# Patient Record
Sex: MALE | Race: WHITE | NOT HISPANIC OR LATINO | Employment: FULL TIME | URBAN - METROPOLITAN AREA
[De-identification: names, ages, dates, MRNs, and addresses within clinical notes are randomized per-mention and may not be internally consistent; named-entity substitution may affect disease eponyms.]

---

## 2017-04-11 ENCOUNTER — OFFICE VISIT (OUTPATIENT)
Dept: FAMILY MEDICINE | Facility: CLINIC | Age: 57
End: 2017-04-11
Payer: COMMERCIAL

## 2017-04-11 VITALS
DIASTOLIC BLOOD PRESSURE: 76 MMHG | HEART RATE: 83 BPM | SYSTOLIC BLOOD PRESSURE: 126 MMHG | WEIGHT: 201 LBS | TEMPERATURE: 98.6 F | BODY MASS INDEX: 26.52 KG/M2

## 2017-04-11 DIAGNOSIS — R05.9 COUGH: Primary | ICD-10-CM

## 2017-04-11 DIAGNOSIS — G47.00 INSOMNIA, UNSPECIFIED TYPE: ICD-10-CM

## 2017-04-11 PROBLEM — Z71.89 ADVANCED DIRECTIVES, COUNSELING/DISCUSSION: Status: ACTIVE | Noted: 2017-04-11

## 2017-04-11 LAB
FEF 25/75: NORMAL
FEV-1: NORMAL
FEV1/FVC: NORMAL
FVC: NORMAL

## 2017-04-11 PROCEDURE — 99214 OFFICE O/P EST MOD 30 MIN: CPT | Mod: 25 | Performed by: FAMILY MEDICINE

## 2017-04-11 PROCEDURE — 94010 BREATHING CAPACITY TEST: CPT | Performed by: FAMILY MEDICINE

## 2017-04-11 RX ORDER — ALBUTEROL SULFATE 90 UG/1
2 AEROSOL, METERED RESPIRATORY (INHALATION) EVERY 6 HOURS PRN
Qty: 1 INHALER | Refills: 1 | Status: SHIPPED | OUTPATIENT
Start: 2017-04-11 | End: 2018-07-12

## 2017-04-11 RX ORDER — TRAZODONE HYDROCHLORIDE 50 MG/1
50 TABLET, FILM COATED ORAL AT BEDTIME
COMMUNITY
Start: 2017-03-21 | End: 2018-08-28

## 2017-04-11 NOTE — MR AVS SNAPSHOT
"              After Visit Summary   2017    Rajiv Finn    MRN: [id number]           Patient Information     Date Of Birth          1960        Visit Information        Provider Department      2017 2:30 PM Shiva Griffin MD [facility]        Today's Diagnoses     Cough    -  1    Insomnia, unspecified type           Follow-ups after your visit        Who to contact     If you have questions or need follow up information about today's clinic visit or your schedule please contact [facility] directly at [phone].  Normal or non-critical lab and imaging results will be communicated to you by [employer], letter or phone within 4 business days after the clinic has received the results. If you do not hear from us within 7 days, please contact the clinic through [employer] or phone. If you have a critical or abnormal lab result, we will notify you by phone as soon as possible.  Submit refill requests through [employer] or call your pharmacy and they will forward the refill request to us. Please allow 3 business days for your refill to be completed.          Additional Information About Your Visit        MyChart Information     [employer] lets you send messages to your doctor, view your test results, renew your prescriptions, schedule appointments and more. To sign up, go to [email or web address] . Click on \"Log in\" on the left side of the screen, which will take you to the Welcome page. Then click on \"Sign up Now\" on the right side of the page.     You will be asked to enter the access code listed below, as well as some personal information. Please follow the directions to create your username and password.     Your access code is: [email or web address]  Expires: 7/10/2017  3:21 PM     Your access code will  in 90 days. If you need help or a new code, please call your [facility] or [phone].        Care EveryWhere ID     This is your Care EveryWhere ID. This could be " used by other organizations to access your [place] medical records  [id number]        Your Vitals Were     Pulse Temperature BMI (Body Mass Index)             83 98.6  F (37  C) (Oral) 26.52 kg/m2          Blood Pressure from Last 3 Encounters:   04/11/17 126/76   05/06/16 136/85   01/27/16 131/78    Weight from Last 3 Encounters:   04/11/17 201 lb (91.2 kg)   05/06/16 193 lb 12.8 oz (87.9 kg)   01/27/16 190 lb 9.6 oz (86.5 kg)              We Performed the Following     Spirometry, Breathing Capacity          Today's Medication Changes          These changes are accurate as of: 4/11/17  3:21 PM.  If you have any questions, ask your nurse or doctor.               Start taking these medicines.        Dose/Directions    albuterol 108 (90 BASE) MCG/ACT Inhaler   Commonly known as:  PROAIR HFA/PROVENTIL HFA/VENTOLIN HFA   Used for:  Cough   Started by:  Shiva Griffin MD        Dose:  2 puff   Inhale 2 puffs into the lungs every 6 hours as needed for shortness of breath / dyspnea or wheezing   Quantity:  1 Inhaler   Refills:  1            Where to get your medicines      These medications were sent to [address]  [address] [place] [address]     Phone:  [phone]     albuterol 108 (90 BASE) MCG/ACT Inhaler                Primary Care Provider Office Phone # Fax #    Shiva Griffin [phone]       [address] [address]        Thank you!     Thank you for choosing [facility]  for your care. Our goal is always to provide you with excellent care. Hearing back from our patients is one way we can continue to improve our services. Please take a few minutes to complete the written survey that you may receive in the mail after your visit with us. Thank you!             Your Updated Medication List - Protect others around you: Learn how to safely use, store  and throw away your medicines at www.disposemymeds.org.          This list is accurate as of: 4/11/17  3:21 PM.  Always use your most recent med list.                   Brand Name Dispense Instructions for use    albuterol 108 (90 BASE) MCG/ACT Inhaler    PROAIR HFA/PROVENTIL HFA/VENTOLIN HFA    1 Inhaler    Inhale 2 puffs into the lungs every 6 hours as needed for shortness of breath / dyspnea or wheezing       tadalafil 20 MG tablet    CIALIS    90 tablet    Take 1 tablet (20 mg) by mouth daily Never use with nitroglycerin, terazosin or doxazosin.       traZODone 50 MG tablet    DESYREL     Take 50 mg by mouth At Bedtime 1-2 at bed time

## 2017-04-11 NOTE — NURSING NOTE
"Chief Complaint   Patient presents with     Sleep Problem     on going      Cough     on going x several years        Initial /76  Pulse 83  Temp 98.6  F (37  C) (Oral)  Wt 201 lb (91.2 kg)  BMI 26.52 kg/m2 Estimated body mass index is 26.52 kg/(m^2) as calculated from the following:    Height as of 6/8/15: 6' 1\" (1.854 m).    Weight as of this encounter: 201 lb (91.2 kg).  Medication Reconciliation: complete  David Lopez CMA    "

## 2017-04-11 NOTE — PROGRESS NOTES
.  SUBJECTIVE:  56 year old.The patient has a history of cough .  This started decades ago. quality occasionally productive/  He has seen ENT and tried nasocort but did not help . ROS no fever chills or asthma. No history of asthma in the family    Reviewed health maintenance  Patient Active Problem List   Diagnosis     CARDIOVASCULAR SCREENING; LDL GOAL LESS THAN 160     Health Care Home     Non-Hodgkin's lymphoma (H)     Cataract     ED (erectile dysfunction)     Advanced directives, counseling/discussion     Past Medical History:   Diagnosis Date     Malignant neoplasm (H)     non-Hodgkins Lymphoma       OBJECTIVE:  no apparent distress  /76  Pulse 83  Temp 98.6  F (37  C) (Oral)  Wt 201 lb (91.2 kg)  BMI 26.52 kg/m2  Nares clear  tms clear  Pharynx clear  LUNGS:  CTA B/L, no wheezing or crackles.   Cardiovascular: negative, PMI normal. No lifts, heaves, or thrills. RRR. No murmurs, clicks gallops or rub   Gastrointestinal: Abdomen soft, non-tender. BS normal. No masses, organomegaly   spirometry    ICD-10-CM    1. Cough R05     PLAN: albuterol inhaler          SUBJECTIVE:  56 year old.The patient has a history of insomnia.  He has seen sleep specialist and declined to do a sleep study.  This started years ago.he has lately been taking Trazodone Associated symptoms are does some snore .  Better with Over the counter antihistamine.  Reviewed health maintenance  Patient Active Problem List   Diagnosis     CARDIOVASCULAR SCREENING; LDL GOAL LESS THAN 160     Health Care Home     Non-Hodgkin's lymphoma (H)     Cataract     ED (erectile dysfunction)     Advanced directives, counseling/discussion     Past Medical History:   Diagnosis Date     Malignant neoplasm (H)     non-Hodgkins Lymphoma       OBJECTIVE:  no apparent distress  /76  Pulse 83  Temp 98.6  F (37  C) (Oral)  Wt 201 lb (91.2 kg)  BMI 26.52 kg/m2    LUNGS:  CTA B/L, no wheezing or crackles.   Cardiovascular: negative, PMI normal. No  lifts, heaves, or thrills. RRR. No murmurs, clicks gallops or rub   Gastrointestinal: Abdomen soft, non-tender. BS normal. No masses, organomegaly       ICD-10-CM    1. Cough R05     PLAN: Recheck 1 week

## 2017-06-23 ENCOUNTER — TELEPHONE (OUTPATIENT)
Dept: FAMILY MEDICINE | Facility: CLINIC | Age: 57
End: 2017-06-23

## 2017-06-23 NOTE — TELEPHONE ENCOUNTER
Per patient taking Trazodone for ~ 4 mos ago, will fall asleep easily, will wake up by 3 am, unable to go back to sleep.   Will lay there for 2-3 hours.  Does not want anything heavy duty, just wants to help sleep throughout the night.   Currently taking Trazodone 50 mg 2 tablets at bedtime.   Discussed there are no extended release dosing for Trazodone, however other medications to consider.   Discussed Hydroxyzine...     Not urgent.  Please advise  Vianca Howard RN

## 2017-06-23 NOTE — TELEPHONE ENCOUNTER
Patient calling states is currently on Trazadone 50 mg, states is wondering if he could try they extended release as starts to wear off. Please call to discuss.

## 2017-06-26 NOTE — TELEPHONE ENCOUNTER
Per verbal order read back Dr. Shiva Griffin :  Have him take trazodone 50mg at bedtime.  If wakes up take another 50mg tab.  See if this works better.   Lianne Avalos RN

## 2017-06-26 NOTE — TELEPHONE ENCOUNTER
Spoke with patient.  Aware of Dr. Shiva Griffin's instructions as per below pertaining to the trazodone.  States is willing to try splitting dose to see if works.  Lianne Avalos RN

## 2018-03-26 ENCOUNTER — DOCUMENTATION ONLY (OUTPATIENT)
Dept: LAB | Facility: CLINIC | Age: 58
End: 2018-03-26

## 2018-03-26 DIAGNOSIS — Z13.1 SCREENING FOR DIABETES MELLITUS: ICD-10-CM

## 2018-03-26 DIAGNOSIS — Z13.6 CARDIOVASCULAR SCREENING; LDL GOAL LESS THAN 160: Primary | ICD-10-CM

## 2018-03-26 DIAGNOSIS — Z11.59 NEED FOR HEPATITIS C SCREENING TEST: ICD-10-CM

## 2018-03-26 NOTE — PROGRESS NOTES
Need previsit labs-See pending orders and close encounter./Vickie Mckenna,         Physical 4/5/18

## 2018-03-26 NOTE — PROGRESS NOTES
Patient has an upcoming previsit appointment on 03/29/2018. Please review pended orders and add additional orders if needed.     Thank you,   Fallon Peterson

## 2018-03-26 NOTE — PROGRESS NOTES
"  SUBJECTIVE:   CC: Rajiv Finn is an 57 year old male who presents for preventative health visit.     Healthy Habits:    Do you get at least three servings of calcium containing foods daily (dairy, green leafy vegetables, etc.)? {YES/NO, DAIRY INTAKE:350563::\"yes\"}    Amount of exercise or daily activities, outside of work: {AMOUNT EXERCISE:546184}    Problems taking medications regularly {Yes /No default:799558::\"No\"}    Medication side effects: {Yes /No default.:967922::\"No\"}    Have you had an eye exam in the past two years? {YESNOBLANK:740631}    Do you see a dentist twice per year? {YESNOBLANK:837192}    Do you have sleep apnea, excessive snoring or daytime drowsiness?{YESNOBLANK:887454}  {Outside tests to abstract? :373803}     {additional problems to add (Optional):611602}    Today's PHQ-2 Score:   PHQ-2 ( 1999 Pfizer) 1/27/2016 9/17/2013   Q1: Little interest or pleasure in doing things 0 0   Q2: Feeling down, depressed or hopeless 0 0   PHQ-2 Score 0 0     {PHQ-2 LOOK IN ASSESSMENTS :004241}  Abuse: Current or Past(Physical, Sexual or Emotional)- {YES/NO/NA:962419}  Do you feel safe in your environment - {YES/NO/NA:706843}    Social History   Substance Use Topics     Smoking status: Never Smoker     Smokeless tobacco: Never Used     Alcohol use No      If you drink alcohol do you typically have >3 drinks per day or >7 drinks per week? {ETOH :511985}                      Last PSA: No results found for: PSA    Reviewed orders with patient. Reviewed health maintenance and updated orders accordingly - {Yes/No:566623::\"Yes\"}  {Chronicprobdata (Optional):301327}    Reviewed and updated as needed this visit by clinical staff         Reviewed and updated as needed this visit by Provider        {HISTORY OPTIONS (Optional):548675}    ROS:  {MALE ROS-adult preventive care package:778059::\"C: NEGATIVE for fever, chills, change in weight\",\"I: NEGATIVE for worrisome rashes, moles or lesions\",\"E: NEGATIVE for vision " "changes or irritation\",\"ENT: NEGATIVE for ear, mouth and throat problems\",\"R: NEGATIVE for significant cough or SOB\",\"CV: NEGATIVE for chest pain, palpitations or peripheral edema\",\"GI: NEGATIVE for nausea, abdominal pain, heartburn, or change in bowel habits\",\" male: negative for dysuria, hematuria, decreased urinary stream, erectile dysfunction, urethral discharge\",\"M: NEGATIVE for significant arthralgias or myalgia\",\"N: NEGATIVE for weakness, dizziness or paresthesias\",\"P: NEGATIVE for changes in mood or affect\"}    OBJECTIVE:   There were no vitals taken for this visit.  EXAM:  {Exam Choices:824567}    ASSESSMENT/PLAN:   {Diag Picklist:904963}    COUNSELING:  {MALE COUNSELING MESSAGES:372837::\"Reviewed preventive health counseling, as reflected in patient instructions\"}    {BP Counseling- Complete if BP >= 120/80  (Optional):507535}   reports that he has never smoked. He has never used smokeless tobacco.  {Tobacco Cessation -- Complete if patient is a smoker (Optional):017514}  Estimated body mass index is 26.52 kg/(m^2) as calculated from the following:    Height as of 6/8/15: 6' 1\" (1.854 m).    Weight as of 4/11/17: 201 lb (91.2 kg).   {Weight Management Plan (ACO) Complete if BMI is abnormal-  Ages 18-64  BMI >24.9.  Age 65+ with BMI <23 or >30 (Optional):293693}    Counseling Resources:  ATP IV Guidelines  Pooled Cohorts Equation Calculator  FRAX Risk Assessment  ICSI Preventive Guidelines  Dietary Guidelines for Americans, 2010  USDA's MyPlate  ASA Prophylaxis  Lung CA Screening    Shiva Griffin MD  [facility]  "

## 2018-03-29 DIAGNOSIS — Z13.6 CARDIOVASCULAR SCREENING; LDL GOAL LESS THAN 160: ICD-10-CM

## 2018-03-29 DIAGNOSIS — Z13.1 SCREENING FOR DIABETES MELLITUS: ICD-10-CM

## 2018-03-29 DIAGNOSIS — Z11.59 NEED FOR HEPATITIS C SCREENING TEST: ICD-10-CM

## 2018-03-29 LAB
ANION GAP SERPL CALCULATED.3IONS-SCNC: 7 MMOL/L (ref 3–14)
BUN SERPL-MCNC: 9 MG/DL (ref 7–30)
CALCIUM SERPL-MCNC: 9.4 MG/DL (ref 8.5–10.1)
CHLORIDE SERPL-SCNC: 104 MMOL/L (ref 94–109)
CHOLEST SERPL-MCNC: 211 MG/DL
CO2 SERPL-SCNC: 27 MMOL/L (ref 20–32)
CREAT SERPL-MCNC: 0.8 MG/DL (ref 0.66–1.25)
GFR SERPL CREATININE-BSD FRML MDRD: >90 ML/MIN/1.7M2
GLUCOSE SERPL-MCNC: 111 MG/DL (ref 70–99)
HCV AB SERPL QL IA: NONREACTIVE
HDLC SERPL-MCNC: 73 MG/DL
LDLC SERPL CALC-MCNC: 121 MG/DL
NONHDLC SERPL-MCNC: 138 MG/DL
POTASSIUM SERPL-SCNC: 4.6 MMOL/L (ref 3.4–5.3)
SODIUM SERPL-SCNC: 138 MMOL/L (ref 133–144)
TRIGL SERPL-MCNC: 87 MG/DL

## 2018-03-29 PROCEDURE — 80061 LIPID PANEL: CPT | Performed by: FAMILY MEDICINE

## 2018-03-29 PROCEDURE — 86803 HEPATITIS C AB TEST: CPT | Performed by: FAMILY MEDICINE

## 2018-03-29 PROCEDURE — 80048 BASIC METABOLIC PNL TOTAL CA: CPT | Performed by: FAMILY MEDICINE

## 2018-03-29 PROCEDURE — 36415 COLL VENOUS BLD VENIPUNCTURE: CPT | Performed by: FAMILY MEDICINE

## 2018-04-05 ENCOUNTER — OFFICE VISIT (OUTPATIENT)
Dept: FAMILY MEDICINE | Facility: CLINIC | Age: 58
End: 2018-04-05
Payer: COMMERCIAL

## 2018-04-05 VITALS
HEIGHT: 73 IN | TEMPERATURE: 99.3 F | RESPIRATION RATE: 16 BRPM | BODY MASS INDEX: 25.79 KG/M2 | DIASTOLIC BLOOD PRESSURE: 85 MMHG | WEIGHT: 194.6 LBS | SYSTOLIC BLOOD PRESSURE: 138 MMHG | HEART RATE: 86 BPM

## 2018-04-05 DIAGNOSIS — N52.9 ERECTILE DYSFUNCTION, UNSPECIFIED ERECTILE DYSFUNCTION TYPE: ICD-10-CM

## 2018-04-05 DIAGNOSIS — Z00.00 ROUTINE GENERAL MEDICAL EXAMINATION AT A HEALTH CARE FACILITY: Primary | ICD-10-CM

## 2018-04-05 DIAGNOSIS — R06.83 SNORING: ICD-10-CM

## 2018-04-05 PROCEDURE — 99396 PREV VISIT EST AGE 40-64: CPT | Performed by: FAMILY MEDICINE

## 2018-04-05 RX ORDER — TADALAFIL 20 MG/1
20 TABLET ORAL DAILY
Qty: 12 TABLET | Refills: 11 | Status: SHIPPED | OUTPATIENT
Start: 2018-04-05 | End: 2020-01-15 | Stop reason: ALTCHOICE

## 2018-04-05 NOTE — PROGRESS NOTES
SUBJECTIVE:   CC: Rajiv Finn is an 57 year old male who presents for preventative health visit.     Physical   Annual:     Getting at least 3 servings of Calcium per day::  Yes    Bi-annual eye exam::  Yes    Dental care twice a year::  Yes    Sleep apnea or symptoms of sleep apnea::  Daytime drowsiness    Diet::  Regular (no restrictions)    Frequency of exercise::  None    Taking medications regularly::  Yes    Medication side effects::  None    Additional concerns today::  No                Patient has insomnia and snoring.   He has seen a specialist and they claim he is not a candidate for a study.  He often uses alcohol to fall asleep. He has tried trazodone and it is not effective    Today's PHQ-2 Score:   PHQ-2 ( 1999 Pfizer) 4/5/2018   Q1: Little interest or pleasure in doing things 0   Q2: Feeling down, depressed or hopeless 0   PHQ-2 Score 0   Q1: Little interest or pleasure in doing things Not at all   Q2: Feeling down, depressed or hopeless Not at all   PHQ-2 Score 0       Abuse: Current or Past(Physical, Sexual or Emotional)- No  Do you feel safe in your environment - Yes    Social History   Substance Use Topics     Smoking status: Never Smoker     Smokeless tobacco: Never Used     Alcohol use No     Alcohol Use 4/5/2018   If you drink alcohol do you typically have greater than 3 drinks per day OR greater than 7 drinks per week? Yes   AUDIT SCORE  6     AUDIT - Alcohol Use Disorders Identification Test - Reproduced from the World Health Organization Audit 2001 (Second Edition) 4/5/2018   1.  How often do you have a drink containing alcohol? 4 or more times a week   2.  How many drinks containing alcohol do you have on a typical day when you are drinking? 1 or 2   3.  How often do you have five or more drinks on one occasion? Monthly   4.  How often during the last year have you found that you were not able to stop drinking once you had started? Never   5.  How often during the last year have you  "failed to do what was normally expected of you because of drinking? Never   6.  How often during the last year have you needed a first drink in the morning to get yourself going after a heavy drinking session? Never   7.  How often during the last year have you had a feeling of guilt or remorse after drinking? Never   8.  How often during the last year have you been unable to remember what happened the night before because of your drinking? Never   9.  Have you or someone else been injured because of your drinking? No   10. Has a relative, friend, doctor or other health care worker been concerned about your drinking or suggested you cut down? No   TOTAL SCORE 6       Last PSA: No results found for: PSA    Reviewed orders with patient. Reviewed health maintenance and updated orders accordingly - Yes       Reviewed and updated as needed this visit by clinical staff  Tobacco  Allergies  Meds  Med Hx  Surg Hx  Fam Hx  Soc Hx        Reviewed and updated as needed this visit by Provider            Review of Systems  C: NEGATIVE for fever, chills, change in weight  I: NEGATIVE for worrisome rashes, moles or lesions  E: NEGATIVE for vision changes or irritation  ENT: NEGATIVE for ear, mouth and throat problems  R: NEGATIVE for significant cough or SOB  CV: NEGATIVE for chest pain, palpitations or peripheral edema  GI: NEGATIVE for nausea, abdominal pain, heartburn, or change in bowel habits   male: negative for dysuria, hematuria, decreased urinary stream, erectile dysfunction, urethral discharge  M: NEGATIVE for significant arthralgias or myalgia  N: NEGATIVE for weakness, dizziness or paresthesias  P: NEGATIVE for changes in mood or affect    OBJECTIVE:   /85  Pulse 86  Temp 99.3  F (37.4  C) (Oral)  Resp 16  Ht 6' 1\" (1.854 m)  Wt 194 lb 9.6 oz (88.3 kg)  BMI 25.67 kg/m2    Physical Exam  GENERAL: healthy, alert and no distress  EYES: Eyes grossly normal to inspection, PERRL and conjunctivae and " "sclerae normal  HENT: ear canals and TM's normal, nose and mouth without ulcers or lesions  NECK: no adenopathy, no asymmetry, masses, or scars and thyroid normal to palpation  RESP: lungs clear to auscultation - no rales, rhonchi or wheezes  CV: regular rate and rhythm, normal S1 S2, no S3 or S4, no murmur, click or rub, no peripheral edema and peripheral pulses strong  ABDOMEN: soft, nontender, no hepatosplenomegaly, no masses and bowel sounds normal  MS: no gross musculoskeletal defects noted, no edema  SKIN: no suspicious lesions or rashes  NEURO: Normal strength and tone, mentation intact and speech normal  PSYCH: mentation appears normal, affect normal/bright    ASSESSMENT/PLAN:       ICD-10-CM    1. Routine general medical examination at a health care facility Z00.00    2. Erectile dysfunction, unspecified erectile dysfunction type N52.9 tadalafil (CIALIS) 20 MG tablet   3. Snoring R06.83 SLEEP EVALUATION & MANAGEMENT REFERRAL - ADULT -[facility] [place]  [phone] (Age 15 and up)       COUNSELING:   Reviewed preventive health counseling, as reflected in patient instructions       Regular exercise       Healthy diet/nutrition         reports that he has never smoked. He has never used smokeless tobacco.    Estimated body mass index is 25.67 kg/(m^2) as calculated from the following:    Height as of this encounter: 6' 1\" (1.854 m).    Weight as of this encounter: 194 lb 9.6 oz (88.3 kg).       Counseling Resources:  ATP IV Guidelines  Pooled Cohorts Equation Calculator  FRAX Risk Assessment  ICSI Preventive Guidelines  Dietary Guidelines for Americans, 2010  USDA's MyPlate  ASA Prophylaxis  Lung CA Screening    Shiva Griffin MD  [facility] [place]  Answers for HPI/ROS submitted by the patient on 4/5/2018   PHQ-2 Score: 0    "

## 2018-04-05 NOTE — MR AVS SNAPSHOT
After Visit Summary   4/5/2018    Rajiv Finn    MRN: [id number]           Patient Information     Date Of Birth          1960        Visit Information        Provider Department      4/5/2018 2:30 PM Shiva Griffin MD [facility] [place]        Today's Diagnoses     Routine general medical examination at a health care facility    -  1    Erectile dysfunction, unspecified erectile dysfunction type        Snoring          Care Instructions      Preventive Health Recommendations  Male Ages 50 - 64    Yearly exam:             See your health care provider every year in order to  o   Review health changes.   o   Discuss preventive care.    o   Review your medicines if your doctor has prescribed any.     Have a cholesterol test every 5 years, or more frequently if you are at risk for high cholesterol/heart disease.     Have a diabetes test (fasting glucose) every three years. If you are at risk for diabetes, you should have this test more often.     Have a colonoscopy at age 50, or have a yearly FIT test (stool test). These exams will check for colon cancer.      Talk with your health care provider about whether or not a prostate cancer screening test (PSA) is right for you.    You should be tested each year for STDs (sexually transmitted diseases), if you re at risk.     Shots: Get a flu shot each year. Get a tetanus shot every 10 years.     Nutrition:    Eat at least 5 servings of fruits and vegetables daily.     Eat whole-grain bread, whole-wheat pasta and brown rice instead of white grains and rice.     Talk to your provider about Calcium and Vitamin D.     Lifestyle    Exercise for at least 150 minutes a week (30 minutes a day, 5 days a week). This will help you control your weight and prevent disease.     Limit alcohol to one drink per day.     No smoking.     Wear sunscreen to prevent skin cancer.     See your dentist every six months for an exam and cleaning.     See your eye  "doctor every 1 to 2 years.            Follow-ups after your visit        Additional Services     SLEEP EVALUATION & MANAGEMENT REFERRAL - [facility] [place]  [phone] (Age 15 and up)       Please be aware that coverage of these services is subject to the terms and limitations of your health insurance plan.  Call member services at your health plan with any benefit or coverage questions.      Please bring the following to your appointment:    >>   List of current medications   >>   This referral request   >>   Any documents/labs given to you for this referral                      Future tests that were ordered for you today     Open Future Orders        Priority Expected Expires Ordered    SLEEP EVALUATION & MANAGEMENT REFERRAL - [facility]  [phone] (Age 15 and up) Routine  4/5/2019 4/5/2018            Who to contact     If you have questions or need follow up information about today's clinic visit or your schedule please contact [facility] directly at [phone].  Normal or non-critical lab and imaging results will be communicated to you by MyChart, letter or phone within 4 business days after the clinic has received the results. If you do not hear from us within 7 days, please contact the clinic through [employer] or phone. If you have a critical or abnormal lab result, we will notify you by phone as soon as possible.  Submit refill requests through [employer] or call your pharmacy and they will forward the refill request to us. Please allow 3 business days for your refill to be completed.          Additional Information About Your Visit        [employer] Information     [employer] lets you send messages to your doctor, view your test results, renew your prescriptions, schedule appointments and more. To sign up, go to [email or web address] . Click on \"Log in\" on the left side of the screen, which will take you to the Welcome page. Then " "click on \"Sign up Now\" on the right side of the page.     You will be asked to enter the access code listed below, as well as some personal information. Please follow the directions to create your username and password.     Your access code is: [email or web address]  Expires: 2018  3:05 PM     Your access code will  in 90 days. If you need help or a new code, please call your [facility] clinic or [phone].        Care EveryWhere ID     This is your Care EveryWhere ID. This could be used by other organizations to access your [facility] medical records  [id number]        Your Vitals Were     Pulse Temperature Respirations Height BMI (Body Mass Index)       86 99.3  F (37.4  C) (Oral) 16 6' 1\" (1.854 m) 25.67 kg/m2        Blood Pressure from Last 3 Encounters:   18 138/85   17 126/76   16 136/85    Weight from Last 3 Encounters:   18 194 lb 9.6 oz (88.3 kg)   17 201 lb (91.2 kg)   16 193 lb 12.8 oz (87.9 kg)                 Where to get your medicines      These medications were sent to [facility] [address] [place] [address], [address]  [address] [place], [place] [address]     Phone:  [phone]     tadalafil 20 MG tablet          Primary Care Provider Office Phone # Fax #    Shiva Griffin [phone] [address] [id number]        Equal Access to Services     [facility] Hospital[facility]I : Hadii mara Manley, waaxda luqadaha, qaybta kaalabhay santos. So [facility] [phone].    ATENCIÓN: Si habla español, tiene a szymanski disposición servicios gratuitos de asistencia lingüística. Llame al [phone].    We comply with applicable federal civil rights laws and [place] laws. We do not discriminate on the basis of race, color, national origin, age, disability, sex, sexual orientation, or gender identity.            Thank you!     Thank you for choosing [facility]  for your care. Our goal is always to provide you with excellent care. Hearing back from our patients is one way we can continue to improve our services. Please take a few minutes to complete the written survey that you may receive in the mail after your visit with us. Thank you!             Your Updated Medication List - Protect others around you: Learn how to safely use, store and throw away your medicines at www.disposemymeds.org.          This list is accurate as of 4/5/18  3:05 PM.  Always use your most recent med list.                   Brand Name Dispense Instructions for use Diagnosis    albuterol 108 (90 BASE) MCG/ACT Inhaler    PROAIR HFA/PROVENTIL HFA/VENTOLIN HFA    1 Inhaler    Inhale 2 puffs into the lungs every 6 hours as needed for shortness of breath / dyspnea or wheezing    Cough       tadalafil 20 MG tablet    CIALIS    12 tablet    Take 1 tablet (20 mg) by mouth daily Never use with nitroglycerin, terazosin or doxazosin.    Erectile dysfunction, unspecified erectile dysfunction type       traZODone 50 MG tablet    DESYREL     Take 50 mg by mouth At Bedtime 1-2 at bed time

## 2018-05-18 ENCOUNTER — OFFICE VISIT (OUTPATIENT)
Dept: SLEEP MEDICINE | Facility: CLINIC | Age: 58
End: 2018-05-18
Payer: COMMERCIAL

## 2018-05-18 VITALS
HEART RATE: 99 BPM | WEIGHT: 199 LBS | DIASTOLIC BLOOD PRESSURE: 83 MMHG | OXYGEN SATURATION: 96 % | BODY MASS INDEX: 26.37 KG/M2 | SYSTOLIC BLOOD PRESSURE: 136 MMHG | HEIGHT: 73 IN

## 2018-05-18 DIAGNOSIS — R06.89 DYSPNEA AND RESPIRATORY ABNORMALITY: Primary | ICD-10-CM

## 2018-05-18 DIAGNOSIS — R06.00 DYSPNEA AND RESPIRATORY ABNORMALITY: Primary | ICD-10-CM

## 2018-05-18 DIAGNOSIS — G47.00 INSOMNIA, UNSPECIFIED TYPE: ICD-10-CM

## 2018-05-18 DIAGNOSIS — R53.83 MALAISE AND FATIGUE: ICD-10-CM

## 2018-05-18 DIAGNOSIS — Z72.820 LACK OF ADEQUATE SLEEP: ICD-10-CM

## 2018-05-18 DIAGNOSIS — R06.83 SNORING: ICD-10-CM

## 2018-05-18 DIAGNOSIS — R53.81 MALAISE AND FATIGUE: ICD-10-CM

## 2018-05-18 PROCEDURE — 99203 OFFICE O/P NEW LOW 30 MIN: CPT | Performed by: PHYSICIAN ASSISTANT

## 2018-05-18 RX ORDER — ZOLPIDEM TARTRATE 5 MG/1
TABLET ORAL
Qty: 1 TABLET | Refills: 0 | Status: SHIPPED | OUTPATIENT
Start: 2018-05-18 | End: 2018-07-12

## 2018-05-18 NOTE — MR AVS SNAPSHOT
After Visit Summary   5/18/2018    Rajiv Finn    MRN: [id number]           Patient Information     Date Of Birth          1960        Visit Information        Provider Department      5/18/2018 3:00 PM Davis Wright PA [facility]        Today's Diagnoses     Dyspnea and respiratory abnormality    -  1    Snoring        Insomnia, unspecified type        Lack of adequate sleep        Malaise and fatigue          Care Instructions      Your BMI is Body mass index is 26.25 kg/(m^2).  Weight management is a personal decision.  If you are interested in exploring weight loss strategies, the following discussion covers the approaches that may be successful. Body mass index (BMI) is one way to tell whether you are at a healthy weight, overweight, or obese. It measures your weight in relation to your height.  A BMI of 18.5 to 24.9 is in the healthy range. A person with a BMI of 25 to 29.9 is considered overweight, and someone with a BMI of 30 or greater is considered obese. More than two-thirds of American adults are considered overweight or obese.  Being overweight or obese increases the risk for further weight gain. Excess weight may lead to heart disease and diabetes.  Creating and following plans for healthy eating and physical activity may help you improve your health.  Weight control is part of healthy lifestyle and includes exercise, emotional health, and healthy eating habits. Careful eating habits lifelong are the mainstay of weight control. Though there are significant health benefits from weight loss, long-term weight loss with diet alone may be very difficult to achieve- studies show long-term success with dietary management in less than 10% of people. Attaining a healthy weight may be especially difficult to achieve in those with severe obesity. In some cases, medications, devices and surgical management might be considered.  What can you do?  If you are overweight or  obese and are interested in methods for weight loss, you should discuss this with your provider.     Consider reducing daily calorie intake by 500 calories.     Keep a food journal.     Avoiding skipping meals, consider cutting portions instead.    Diet combined with exercise helps maintain muscle while optimizing fat loss. Strength training is particularly important for building and maintaining muscle mass. Exercise helps reduce stress, increase energy, and improves fitness. Increasing exercise without diet control, however, may not burn enough calories to loose weight.       Start walking three days a week 10-20 minutes at a time    Work towards walking thirty minutes five days a week     Eventually, increase the speed of your walking for 1-2 minutes at time    In addition, we recommend that you review healthy lifestyles and methods for weight loss available through the National Institutes of Health patient information sites:  http://win.niddk.nih.gov/publications/index.htm    And look into health and wellness programs that may be available through your health insurance provider, employer, local community center, or carmela club.    Weight management plan: Patient was referred to their PCP to discuss a diet and exercise plan.              Follow-ups after your visit        Your next 10 appointments already scheduled     Jun 29, 2018  8:00 PM CDT   PSG Split with BK BED 1   [facility] ([facility])    [address] [address]   [phone]            Jul 12, 2018  1:30 PM CDT   Return Sleep Patient with TAYLOR Staples   [facility] ([facility])    [address] [facility] [address]  [facility] [address]   [phone]              Future tests that were ordered for you today     Open Future Orders        Priority Expected Expires Ordered    Comprehensive Sleep Study Routine  11/14/2018  "2018            Who to contact     If you have questions or need follow up information about today's clinic visit or your schedule please contact [facility] directly at [phone].  Normal or non-critical lab and imaging results will be communicated to you by MyChart, letter or phone within 4 business days after the clinic has received the results. If you do not hear from us within 7 days, please contact the clinic through MyChart or phone. If you have a critical or abnormal lab result, we will notify you by phone as soon as possible.  Submit refill requests through [employer] or call your pharmacy and they will forward the refill request to us. Please allow 3 business days for your refill to be completed.          Additional Information About Your Visit        [facility] Information     [employer] lets you send messages to your doctor, view your test results, renew your prescriptions, schedule appointments and more. To sign up, go to [email or web address] . Click on \"Log in\" on the left side of the screen, which will take you to the Welcome page. Then click on \"Sign up Now\" on the right side of the page.     You will be asked to enter the access code listed below, as well as some personal information. Please follow the directions to create your username and password.     Your access code is: [email or web address]  Expires: 2018  3:05 PM     Your access code will  in 90 days. If you need help or a new code, please call your [facility] clinic or [phone].        Care EveryWhere ID     This is your Care EveryWhere ID. This could be used by other organizations to access your [facility] medical records  [id number]        Your Vitals Were     Pulse Height Pulse Oximetry BMI (Body Mass Index)          99 1.854 m (6' 1\") 96% 26.25 kg/m2         Blood Pressure from Last 3 Encounters:   18 143/83   18 138/85   17 126/76    Weight from Last 3 Encounters:   18 90.3 kg (199 lb) "   04/05/18 88.3 kg (194 lb 9.6 oz)   04/11/17 91.2 kg (201 lb)              We Performed the Following     SLEEP EVALUATION & MANAGEMENT REFERRAL - ADULT -[facility]  [phone] (Age 15 and up)          Today's Medication Changes          These changes are accurate as of 5/18/18  3:33 PM.  If you have any questions, ask your nurse or doctor.               Start taking these medicines.        Dose/Directions    zolpidem 5 MG tablet   Commonly known as:  AMBIEN   Started by:  Davsi Wright PA        Take tablet by mouth 15 minutes prior to sleep, for Sleep Study   Quantity:  1 tablet   Refills:  0            Where to get your medicines      Some of these will need a paper prescription and others can be bought over the counter.  Ask your nurse if you have questions.     Bring a paper prescription for each of these medications     zolpidem 5 MG tablet                Primary Care Provider Office Phone # Fax #    Shiva Sami Griffin [phone] [address] [facility] [address]        Equal Access to Services     CHALINO CASTREJON : Hadii mara ku hadasho Soomaali, waaxda luqadaha, qaybta kaalmada adeegyada, waxay ida haybehzad barroso . So [facility] [phone].    ATENCIÓN: Si habla español, tiene a szymanski disposición servicios gratuitos de asistencia lingüística. Llame al [phone].    We comply with applicable federal civil rights laws and [place] laws. We do not discriminate on the basis of race, color, national origin, age, disability, sex, sexual orientation, or gender identity.            Thank you!     Thank you for choosing [facility]  for your care. Our goal is always to provide you with excellent care. Hearing back from our patients is one way we can continue to improve our services. Please take a few minutes to complete the written survey that you may receive in the mail after your visit with us. Thank you!             Your  Updated Medication List - Protect others around you: Learn how to safely use, store and throw away your medicines at www.disposemymeds.org.          This list is accurate as of 5/18/18  3:33 PM.  Always use your most recent med list.                   Brand Name Dispense Instructions for use Diagnosis    albuterol 108 (90 Base) MCG/ACT Inhaler    PROAIR HFA/PROVENTIL HFA/VENTOLIN HFA    1 Inhaler    Inhale 2 puffs into the lungs every 6 hours as needed for shortness of breath / dyspnea or wheezing    Cough       tadalafil 20 MG tablet    CIALIS    12 tablet    Take 1 tablet (20 mg) by mouth daily Never use with nitroglycerin, terazosin or doxazosin.    Erectile dysfunction, unspecified erectile dysfunction type       traZODone 50 MG tablet    DESYREL     Take 50 mg by mouth At Bedtime 1-2 at bed time        zolpidem 5 MG tablet    AMBIEN    1 tablet    Take tablet by mouth 15 minutes prior to sleep, for Sleep Study

## 2018-05-18 NOTE — PATIENT INSTRUCTIONS

## 2018-05-18 NOTE — PROGRESS NOTES
Sleep Consultation:    Date on this visit: 5/18/2018    Rajiv Finn is sent by Shiva Griffin for a sleep consultation regarding possible sleep disordered breathing.    Primary Physician: Shiva Griffin     Chief Complaint   Patient presents with     Sleep Problem     consult- not sleeping well       Rajiv goes to sleep at 10:00 PM during the week. He wakes up at 6:00 AM with an alarm. He falls asleep in 15 minutes.  He takes Trazodone 50 mg nightly. Rajiv denies difficulty falling asleep.  He wakes up 1-2 times a night for 120 minutes before falling back to sleep. His mind is overactive.  Rajiv wakes up to go to the bathroom.  On weekends, Rajiv goes to sleep at 10:00 PM.  He wakes up at 7:00 AM without an alarm. He falls asleep in 15 minutes.  Patient gets an average of 5 hours of sleep per night. He does not feel refreshed.     Patient does watch TV in bed and does not use electronics in bed and read in bed.     Rajiv does not do shift work.     Rajiv does snore every night and snoring is very loud. Patient does have a regular bed partner. There is report of snoring.  He does not have witnessed apneas. They never sleep separately.  Patient sleeps on his back and side. He denies morning dry mouth, morning headaches, morning confusion and restless legs. Rajiv denies any bruxism, sleep walking, sleep talking, dream enactment, sleep paralysis, cataplexy and hypnogogic/hypnopompic hallucinations.    Rajiv denies difficulty breathing through his nose, claustrophobia and reflux at night.      Rajiv has gained 0-5 pounds in the last year.  Patient describes themself as a morning person.  He would prefer to go to sleep at 10:00 PM and wake up at 6:00 AM.  Patient's [place] Sleepiness score 1/24 inconsistent with excessive  daytime sleepiness.  He has fatigue.     Rajiv does not take nap. He takes some inadvertant naps.  He denies falling asleep while driving.  Patient was counseled on the  importance of driving while alert, to pull over if drowsy, or nap before getting into the vehicle if sleepy.  He uses no caffeine.     Allergies:    No Known Allergies    Medications:    Current Outpatient Prescriptions   Medication Sig Dispense Refill     traZODone (DESYREL) 50 MG tablet Take 50 mg by mouth At Bedtime 1-2 at bed time       zolpidem (AMBIEN) 5 MG tablet Take tablet by mouth 15 minutes prior to sleep, for Sleep Study 1 tablet 0     albuterol (PROAIR HFA/PROVENTIL HFA/VENTOLIN HFA) 108 (90 BASE) MCG/ACT Inhaler Inhale 2 puffs into the lungs every 6 hours as needed for shortness of breath / dyspnea or wheezing (Patient not taking: Reported on 4/5/2018) 1 Inhaler 1     tadalafil (CIALIS) 20 MG tablet Take 1 tablet (20 mg) by mouth daily Never use with nitroglycerin, terazosin or doxazosin. (Patient not taking: Reported on 5/18/2018) 12 tablet 11       Problem List:  Patient Active Problem List    Diagnosis Date Noted     Advanced directives, counseling/discussion 04/11/2017     Priority: Medium     Insomnia 01/19/2017     Priority: Medium     ED (erectile dysfunction) 09/17/2013     Priority: Medium     Non-Hodgkin's lymphoma (H) 10/19/2011     Priority: Medium     Health Care Home 07/13/2011     Priority: Medium     X  Unable to contact the patient at this time.Phone number is disconnecte, [facility] letter has been sent. 7/14/11. EG  DX V65.8 REPLACED WITH 89089 HEALTH CARE HOME (04/08/2013)       CARDIOVASCULAR SCREENING; LDL GOAL LESS THAN 160 10/31/2010     Priority: Medium        Past Medical/Surgical History:  Past Medical History:   Diagnosis Date     Cataract 8/6/2013     [place] Update utility     Malignant neoplasm (H)     non-Hodgkins Lymphoma     Past Surgical History:   Procedure Laterality Date     COLONOSCOPY WITH CO2 INSUFFLATION N/A 6/8/2015    Procedure: COLONOSCOPY WITH CO2 INSUFFLATION;  Surgeon: Ritesh Castle MD;  Location: [facility] OR     NO HISTORY OF SURGERY         Social  "History:  Social History     Social History     Marital status:      Spouse name: N/A     Number of children: N/A     Years of education: N/A     Occupational History     Not on file.     Social History Main Topics     Smoking status: Never Smoker     Smokeless tobacco: Never Used     Alcohol use No     Drug use: No     Sexual activity: Yes     Partners: Female     Other Topics Concern     Not on file     Social History Narrative       Family History:  No family history on file.    Review of Systems:  CONSTITUTIONAL: NEGATIVE for weight gain/loss, fever, chills, sweats or night sweats, drug allergies.  EYES: NEGATIVE for changes in vision, blind spots, double vision.  ENT: NEGATIVE for ear pain, sore throat, sinus pain, post-nasal drip, runny nose, bloody nose  CARDIAC: NEGATIVE for fast heartbeats or fluttering in chest, chest pain or pressure, breathlessness when lying flat, swollen legs or swollen feet.  NEUROLOGIC: NEGATIVE headaches, weakness or numbness in the arms or legs.  DERMATOLOGIC: NEGATIVE for rashes, new moles or change in mole(s)  PULMONARY: NEGATIVE SOB at rest, SOB with activity, dry cough, productive cough, coughing up blood, wheezing or whistling when breathing.    GASTROINTESTINAL: NEGATIVE for nausea or vomitting, loose or watery stools, fat or grease in stools, constipation, abdominal pain, bowel movements black in color or blood noted.  GENITOURINARY: NEGATIVE for pain during urination, blood in urine, urinating more frequently than usual.  MUSCULOSKELETAL: NEGATIVE for muscle pain, bone or joint pain, swollen joints.  ENDOCRINE: NEGATIVE for increased thirst or urination, diabetes.  LYMPHATIC: NEGATIVE for swollen lymph nodes, lumps or bumps in the breasts or nipple discharge.    Physical Examination:  Vitals: /83  Pulse 99  Ht 1.854 m (6' 1\")  Wt 90.3 kg (199 lb)  SpO2 96%  BMI 26.25 kg/m2  BMI= Body mass index is 26.25 kg/(m^2).         Brooks Total Score 5/18/2018 "   Total score - [place] 1       GENERAL APPEARANCE: alert and no distress  EYES: Eyes grossly normal to inspection, PERRL and conjunctivae and sclerae normal  HENT: ear canals and TM's normal, nose and mouth without ulcers or lesions, oropharynx crowded and uvula elongated  NECK: no asymmetry, masses, or scars  RESP: lungs clear to auscultation - no rales, rhonchi or wheezes  CV: regular rates and rhythm and normal S1 S2, no S3 or S4  MS: extremities normal- no gross deformities noted  NEURO: Normal strength and tone, mentation intact and speech normal  PSYCH: mentation appears normal and affect normal/bright  Mallampati Class: II.  Tonsillar Stage:     Last Basic Metabolic Panel:  Lab Results   Component Value Date     03/29/2018      Lab Results   Component Value Date    POTASSIUM 4.6 03/29/2018     Lab Results   Component Value Date    CHLORIDE 104 03/29/2018     Lab Results   Component Value Date    MINDY 9.4 03/29/2018     Lab Results   Component Value Date    CO2 27 03/29/2018     Lab Results   Component Value Date    BUN 9 03/29/2018     Lab Results   Component Value Date    CR 0.80 03/29/2018     Lab Results   Component Value Date     03/29/2018     No results found for: TSH]    Impression:  Patient has features and risk factors for possible obstructive sleep apnea including: loud snoring, non-refreshing sleep, daytime fatigue, sleep maintenance insomnia and crowded oropharynx. The STOP-BANG score is 4/8.     Plan:    1. Recommend Polysomnogram (using 4% desaturation/Medicare/2012 AASM 1B scoring rules) for intermediate risk obstructive sleep apnea. Ambien if needed. Patient is a poor candidate for Home Sleep Testing due to not high probability of MARGARET and insomnia.  2. Advised him against drowsy driving.    3. Recommend weight management.     Literature provided regarding sleep apnea.      He will follow up with me in approximately two weeks after his sleep study has been competed to review the  results and discuss plan of care.       Polysomnography reviewed.  Obstructive sleep apnea reviewed.  Complications of untreated sleep apnea were reviewed.    Davis Wright PA-C    CC: Shiva Griffin

## 2018-05-18 NOTE — NURSING NOTE
"Chief Complaint   Patient presents with     Sleep Problem     consult- not sleeping well       Initial /83  Pulse 99  Ht 1.854 m (6' 1\")  Wt 90.3 kg (199 lb)  SpO2 96%  BMI 26.25 kg/m2 Estimated body mass index is 26.25 kg/(m^2) as calculated from the following:    Height as of this encounter: 1.854 m (6' 1\").    Weight as of this encounter: 90.3 kg (199 lb).    Medication Reconciliation: complete    Neck circumference: 14.5 inches / 37 centimeters.      "

## 2018-06-29 ENCOUNTER — THERAPY VISIT (OUTPATIENT)
Dept: SLEEP MEDICINE | Facility: CLINIC | Age: 58
End: 2018-06-29
Payer: COMMERCIAL

## 2018-06-29 DIAGNOSIS — R53.81 MALAISE AND FATIGUE: ICD-10-CM

## 2018-06-29 DIAGNOSIS — R06.89 DYSPNEA AND RESPIRATORY ABNORMALITY: ICD-10-CM

## 2018-06-29 DIAGNOSIS — Z72.820 LACK OF ADEQUATE SLEEP: ICD-10-CM

## 2018-06-29 DIAGNOSIS — R06.00 DYSPNEA AND RESPIRATORY ABNORMALITY: ICD-10-CM

## 2018-06-29 DIAGNOSIS — R06.83 SNORING: ICD-10-CM

## 2018-06-29 DIAGNOSIS — G47.00 INSOMNIA, UNSPECIFIED TYPE: ICD-10-CM

## 2018-06-29 DIAGNOSIS — R53.83 MALAISE AND FATIGUE: ICD-10-CM

## 2018-06-29 PROCEDURE — 95810 POLYSOM 6/> YRS 4/> PARAM: CPT | Performed by: INTERNAL MEDICINE

## 2018-06-29 NOTE — MR AVS SNAPSHOT
"              After Visit Summary   6/29/2018    Rajiv Finn    MRN: [id number]           Patient Information     Date Of Birth          1960        Visit Information        Provider Department      6/29/2018 8:00 PM BK BED 1 [facility]        Today's Diagnoses     Snoring        Insomnia, unspecified type        Lack of adequate sleep        Dyspnea and respiratory abnormality        Malaise and fatigue           Follow-ups after your visit        Your next 10 appointments already scheduled     Jul 12, 2018  1:30 PM CDT   Return Sleep Patient with TAYLOR Staples   [facility] ([facility])    [address] [place] [id number] [phone]              Who to contact     If you have questions or need follow up information about today's clinic visit or your schedule please contact [facility]EP [facility] directly at [phone].  Normal or non-critical lab and imaging results will be communicated to you by MyChart, letter or phone within 4 business days after the clinic has received the results. If you do not hear from us within 7 days, please contact the clinic through SendtoNewshart or phone. If you have a critical or abnormal lab result, we will notify you by phone as soon as possible.  Submit refill requests through [employer] or call your pharmacy and they will forward the refill request to us. Please allow 3 business days for your refill to be completed.          Additional Information About Your Visit        MyChart Information     [employer] lets you send messages to your doctor, view your test results, renew your prescriptions, schedule appointments and more. To sign up, go to [email or web address] . Click on \"Log in\" on the left side of the screen, which will take you to the Welcome page. Then click on \"Sign up Now\" on the right side of the page.     You will be asked to enter the access code listed below, as well " as some personal information. Please follow the directions to create your username and password.     Your access code is: [email or web address]  Expires: 2018  3:05 PM     Your access code will  in 90 days. If you need help or a new code, please call your [place] clinic or [phone].        Care EveryWhere ID     This is your Care EveryWhere ID. This could be used by other organizations to access your [place] medical records  [id number]         Blood Pressure from Last 3 Encounters:   18 136/83   18 138/85   17 126/76    Weight from Last 3 Encounters:   18 90.3 kg (199 lb)   18 88.3 kg (194 lb 9.6 oz)   17 91.2 kg (201 lb)              We Performed the Following     Comprehensive Sleep Study        Primary Care Provider Office Phone # Fax #    Shiva Sami Griffin [phone] [address] [facility] [address]        Equal Access to Services     [facility]: Hadii aad ku hadasho Soomaali, waaxda luqadaha, qaybta kaalmada adeegyada, abhay barroso . So [facility] [phone].    ATENCIÓN: Si habla español, tiene a szymanski disposición servicios gratuitos de asistencia lingüística. Llame al [phone].    We comply with applicable federal civil rights laws and [place] laws. We do not discriminate on the basis of race, color, national origin, age, disability, sex, sexual orientation, or gender identity.            Thank you!     Thank you for choosing [facility]  for your care. Our goal is always to provide you with excellent care. Hearing back from our patients is one way we can continue to improve our services. Please take a few minutes to complete the written survey that you may receive in the mail after your visit with us. Thank you!             Your Updated Medication List - Protect others around you: Learn how to safely use, store and throw away your medicines at www.disposemymeds.org.          This list is  accurate as of 6/29/18 11:59 PM.  Always use your most recent med list.                   Brand Name Dispense Instructions for use Diagnosis    albuterol 108 (90 Base) MCG/ACT Inhaler    PROAIR HFA/PROVENTIL HFA/VENTOLIN HFA    1 Inhaler    Inhale 2 puffs into the lungs every 6 hours as needed for shortness of breath / dyspnea or wheezing    Cough       tadalafil 20 MG tablet    CIALIS    12 tablet    Take 1 tablet (20 mg) by mouth daily Never use with nitroglycerin, terazosin or doxazosin.    Erectile dysfunction, unspecified erectile dysfunction type       traZODone 50 MG tablet    DESYREL     Take 50 mg by mouth At Bedtime 1-2 at bed time        zolpidem 5 MG tablet    AMBIEN    1 tablet    Take tablet by mouth 15 minutes prior to sleep, for Sleep Study

## 2018-07-11 LAB — SLPCOMP: NORMAL

## 2018-07-11 NOTE — PROCEDURES
" SLEEP STUDY INTERPRETATION  DIAGNOSTIC POLYSOMNOGRAPHY REPORT      Patient: RYAN MELTON  YOB: 1960  Study Date: 6/29/2018  MRN: [id number]  Referring Provider: Dr. Griffin  Ordering Provider: Dr. Landin    Indications for Polysomnography: The patient is a 57 y old Male who is 6' 1\" and weighs 199.0 lbs. His BMI is 26.4, [place] sleepiness scale 1.0 and neck circumference is 37.0 cm. Relevant medical history includes Insomnia, ED, and non-Hodgkin's lymphoma. A diagnostic polysomnogram was performed to evaluate for MARGARET.    Polysomnogram Data: A full night polysomnogram recorded the standard physiologic parameters including EEG, EOG, EMG, ECG, nasal and oral airflow. Respiratory parameters of chest and abdominal movements were recorded with respiratory inductance plethysmography. Oxygen saturation was recorded by pulse oximetry. Hypopnea scoring rule used: 1B 4%.    Sleep Architecture: Increased sleep latency despite zolpidem 5 mg, increased arousal index, and decreased sleep efficiency.  The total recording time of the polysomnogram was 464.3 minutes. The total sleep time was 360.0 minutes. Sleep latency was increased at 38.4 minutes with the use of a sleep aid. REM latency was 30.0 minutes. Arousal index was increased at 25.7 arousals per hour. Sleep efficiency was decreased at 77.5%. Wake after sleep onset was 64.5 minutes. The patient spent 16.3% of total sleep time in Stage N1, 43.2% in Stage N2, 14.2% in Stage N3, and 26.4% in REM. Time in REM supine was 5.5 minutes.    Respiration: Tachypnea noted during wake with borderline tachypnea during sleep.  Snoring without significant sleep-disordered breathing by AHI (consider 3% scoring if symptomatic).    Events ? The polysomnogram revealed a presence of 17 obstructive, - central, and - mixed apneas resulting in an apnea index of 2.8 events per hour. There were 8 obstructive hypopneas and - central hypopneas resulting in an obstructive hypopnea " index of 1.3 and central hypopnea index of - events per hour. The combined apnea/hypopnea index was 4.2 events per hour (central apnea/hypopnea index was - events per hour). The REM AHI was 3.2 events per hour. The supine AHI was 11.9 events per hour. The RERA index was 3.0 events per hour.  The RDI was 7.2 events per hour.    Snoring - was reported as mild to moderate (difficult to hear over fan).    Respiratory rate and pattern - was notable for tachypnea.    Sustained Sleep Associated Hypoventilation - Transcutaneous carbon dioxide monitoring was not used; however significant hypoventilation was not suggested by oximetry.    Sleep Associated Hypoxemia - (Greater than 5 minutes O2 sat at or below 88%) was not present. Baseline oxygen saturation was 95.8%. Lowest oxygen saturation was 86.5%. Time spent less than or equal to 88% was 0.2 minutes. Time spent less than or equal to 89% was 0.5 minutes.    Movement Activity: Marked increase in PLM activity.  Abnormal REM EMG activity.    Periodic Limb Activity - There were 267 PLMs during the entire study. The PLM index was 44.5 movements per hour. The PLM Arousal Index was 11.0 per hour.    REM EMG Activity - Excessive transient muscle activity was present.    Nocturnal Behavior - Abnormal sleep related behaviors were not noted.    Bruxism - None apparent.    Cardiac Summary: No significant arrhythmias noted.  The average pulse rate was 67.8 bpm. The minimum pulse rate was 51.9 bpm while the maximum pulse rate was 96.9 bpm.  Arrhythmias were not noted.    Assessment:     Increased sleep latency despite zolpidem 5 mg, increased arousal index, and decreased sleep efficiency.    Tachypnea noted during wake with borderline tachypnea during sleep.      Snoring without significant sleep-disordered breathing by AHI (consider 3% scoring if symptomatic).    Marked increase in PLM activity.      Abnormal REM EMG activity.    No significant arrhythmias  noted.    Recommendations:    Clinical correlation for abnormal REM EMG activity.    Clinical correlation for tachypnea.    Advice regarding the risks of drowsy driving.    Suggest optimizing sleep schedule and avoiding sleep deprivation.    Pharmacologic therapy should be used for management of restless legs syndrome only if present and clinically indicated and not based on the presence of periodic limb movements alone.    Diagnostic Codes:   Unspecified Sleep Disturbance G47.9  Parasomnia, Unspecified G47.50   _____________________________________   Electronically Signed By: Jhon Mckay MD 7/10/2018

## 2018-07-12 ENCOUNTER — OFFICE VISIT (OUTPATIENT)
Dept: SLEEP MEDICINE | Facility: CLINIC | Age: 58
End: 2018-07-12
Payer: COMMERCIAL

## 2018-07-12 VITALS
SYSTOLIC BLOOD PRESSURE: 136 MMHG | BODY MASS INDEX: 26.37 KG/M2 | DIASTOLIC BLOOD PRESSURE: 76 MMHG | HEIGHT: 73 IN | OXYGEN SATURATION: 98 % | HEART RATE: 76 BPM | WEIGHT: 199 LBS

## 2018-07-12 DIAGNOSIS — R06.83 SNORING: ICD-10-CM

## 2018-07-12 DIAGNOSIS — G47.61 PLMD (PERIODIC LIMB MOVEMENT DISORDER): ICD-10-CM

## 2018-07-12 DIAGNOSIS — G47.61 PLMD (PERIODIC LIMB MOVEMENT DISORDER): Primary | ICD-10-CM

## 2018-07-12 LAB — FERRITIN SERPL-MCNC: 226 NG/ML (ref 26–388)

## 2018-07-12 PROCEDURE — 36415 COLL VENOUS BLD VENIPUNCTURE: CPT | Performed by: PHYSICIAN ASSISTANT

## 2018-07-12 PROCEDURE — 82728 ASSAY OF FERRITIN: CPT | Performed by: PHYSICIAN ASSISTANT

## 2018-07-12 PROCEDURE — 99214 OFFICE O/P EST MOD 30 MIN: CPT | Performed by: PHYSICIAN ASSISTANT

## 2018-07-12 RX ORDER — GABAPENTIN 100 MG/1
CAPSULE ORAL
Qty: 90 CAPSULE | Refills: 1 | Status: SHIPPED | OUTPATIENT
Start: 2018-07-12 | End: 2018-10-02

## 2018-07-12 NOTE — PROGRESS NOTES
Sleep Study Follow-Up Visit:    Date on this visit: 7/12/2018    Rajiv Finn comes in today for follow-up of his sleep study done on 6/29/2018 at the [facility] for loud snoring, non-refreshing sleep, daytime fatigue, sleep maintenance insomnia and crowded oropharynx.    Diagnostic PSG as interpreted by Dr. Mckay-  Polysomnogram Data: A full night polysomnogram recorded the standard physiologic parameters including EEG, EOG, EMG, ECG, nasal and oral airflow. Respiratory parameters of chest and abdominal movements were recorded with respiratory inductance plethysmography. Oxygen saturation was recorded by pulse oximetry. Hypopnea scoring rule used: 1B 4%.     Sleep Architecture: Increased sleep latency despite zolpidem 5 mg, increased arousal index, and decreased sleep efficiency.  The total recording time of the polysomnogram was 464.3 minutes. The total sleep time was 360.0 minutes. Sleep latency was increased at 38.4 minutes with the use of a sleep aid. REM latency was 30.0 minutes. Arousal index was increased at 25.7 arousals per hour. Sleep efficiency was decreased at 77.5%. Wake after sleep onset was 64.5 minutes. The patient spent 16.3% of total sleep time in Stage N1, 43.2% in Stage N2, 14.2% in Stage N3, and 26.4% in REM. Time in REM supine was 5.5 minutes.     Respiration: Tachypnea noted during wake with borderline tachypnea during sleep.  Snoring without significant sleep-disordered breathing by AHI (consider 3% scoring if symptomatic).    Events ? The polysomnogram revealed a presence of 17 obstructive, - central, and - mixed apneas resulting in an apnea index of 2.8 events per hour. There were 8 obstructive hypopneas and - central hypopneas resulting in an obstructive hypopnea index of 1.3 and central hypopnea index of - events per hour. The combined apnea/hypopnea index was 4.2 events per hour (central apnea/hypopnea index was - events per hour). The REM AHI was 3.2  events per hour. The supine AHI was 11.9 events per hour. The RERA index was 3.0 events per hour.  The RDI was 7.2 events per hour.    Snoring - was reported as mild to moderate (difficult to hear over fan).    Respiratory rate and pattern - was notable for tachypnea.    Sustained Sleep Associated Hypoventilation - Transcutaneous carbon dioxide monitoring was not used; however significant hypoventilation was not suggested by oximetry.    Sleep Associated Hypoxemia - (Greater than 5 minutes O2 sat at or below 88%) was not present. Baseline oxygen saturation was 95.8%. Lowest oxygen saturation was 86.5%. Time spent less than or equal to 88% was 0.2 minutes. Time spent less than or equal to 89% was 0.5 minutes.     Movement Activity: Marked increase in PLM activity.  Abnormal REM EMG activity.    Periodic Limb Activity - There were 267 PLMs during the entire study. The PLM index was 44.5 movements per hour. The PLM Arousal Index was 11.0 per hour.    REM EMG Activity - Excessive transient muscle activity was present.    Nocturnal Behavior - Abnormal sleep related behaviors were not noted.    Bruxism - None apparent.     Cardiac Summary: No significant arrhythmias noted.  The average pulse rate was 67.8 bpm. The minimum pulse rate was 51.9 bpm while the maximum pulse rate was 96.9 bpm.  Arrhythmias were not noted.     These findings were reviewed with patient. A copy of the sleep study was given to the patient for his records.     Past medical/surgical history, family history, social history, medications and allergies were reviewed.      Problem List:  Patient Active Problem List    Diagnosis Date Noted     Advanced directives, counseling/discussion 04/11/2017     Priority: Medium     Insomnia 01/19/2017     Priority: Medium     ED (erectile dysfunction) 09/17/2013     Priority: Medium     Non-Hodgkin's lymphoma (H) 10/19/2011     Priority: Medium     Health Care Home 07/13/2011     Priority: Medium     X  Unable to  "contact the patient at this time.Phone number is joo, [facility] letter has been sent. 7/14/11. EG  DX V65.8 REPLACED WITH [id number] HEALTH CARE HOME (04/08/2013)       CARDIOVASCULAR SCREENING; LDL GOAL LESS THAN 160 10/31/2010     Priority: Medium      /76  Pulse 76  Ht 1.854 m (6' 1\")  Wt 90.3 kg (199 lb)  SpO2 98%  BMI 26.25 kg/m2  Impression/Plan:  1. The patient's sleep disordered breathing did not reach a level of clinical significance with AHI of <5. This was a good study that included time spent in REM sleep.     2. Patient had elevated periodic limp movements during the study. Patient denies wakeful motor restlessness.   - Discontinue Trazodone and start gabapentin 100-300 mg an hour before bedtime.  - Check Ferritin.    3. Abnormal EMG activity.  -  Patient is here with wife and she reports active dreams and movement of legs at night.   -  He denies problems with sense of smell, constipation and treomor.  -  Discussed etiology of NREM/REM parasomnias, reduction of triggers (novel environments, caffeine, alcohol, sleep deprivation), safety (consideration of door, matress rather than bed, securing firearms) and consideration for pharmacotherapy(Melatonin or Clonazepam). He wants to wait on pharmacotherapy given not getting out of bed.    4. Tachypnea noted during wake with borderline tachypnea during sleep(18-22)  - He denies dyspnea.  - I advised that he f/u with his PCP for further evaluation and management.       He will follow up with me in about 2 month(s).     Twenty-five minutes spent with patient, all of which were spent face-to-face counseling, consulting, coordinating plan of care regarding MARGARET, PLMD, RBD and tachypnea.      Davis Wright PA-C    CC: Shiva Griffin     "

## 2018-07-12 NOTE — PATIENT INSTRUCTIONS

## 2018-07-12 NOTE — MR AVS SNAPSHOT
After Visit Summary   7/12/2018    Rajiv Finn    MRN: [id number]           Patient Information     Date Of Birth          1960        Visit Information        Provider Department      7/12/2018 1:30 PM Davis Wright PA [facility]        Today's Diagnoses     PLMD (periodic limb movement disorder)    -  1    Snoring          Care Instructions      Your BMI is Body mass index is 26.25 kg/(m^2).  Weight management is a personal decision.  If you are interested in exploring weight loss strategies, the following discussion covers the approaches that may be successful. Body mass index (BMI) is one way to tell whether you are at a healthy weight, overweight, or obese. It measures your weight in relation to your height.  A BMI of 18.5 to 24.9 is in the healthy range. A person with a BMI of 25 to 29.9 is considered overweight, and someone with a BMI of 30 or greater is considered obese. More than two-thirds of American adults are considered overweight or obese.  Being overweight or obese increases the risk for further weight gain. Excess weight may lead to heart disease and diabetes.  Creating and following plans for healthy eating and physical activity may help you improve your health.  Weight control is part of healthy lifestyle and includes exercise, emotional health, and healthy eating habits. Careful eating habits lifelong are the mainstay of weight control. Though there are significant health benefits from weight loss, long-term weight loss with diet alone may be very difficult to achieve- studies show long-term success with dietary management in less than 10% of people. Attaining a healthy weight may be especially difficult to achieve in those with severe obesity. In some cases, medications, devices and surgical management might be considered.  What can you do?  If you are overweight or obese and are interested in methods for weight loss, you should discuss this with your  provider.     Consider reducing daily calorie intake by 500 calories.     Keep a food journal.     Avoiding skipping meals, consider cutting portions instead.    Diet combined with exercise helps maintain muscle while optimizing fat loss. Strength training is particularly important for building and maintaining muscle mass. Exercise helps reduce stress, increase energy, and improves fitness. Increasing exercise without diet control, however, may not burn enough calories to loose weight.       Start walking three days a week 10-20 minutes at a time    Work towards walking thirty minutes five days a week     Eventually, increase the speed of your walking for 1-2 minutes at time    In addition, we recommend that you review healthy lifestyles and methods for weight loss available through the National Institutes of Health patient information sites:  http://win.niddk.nih.gov/publications/index.htm    And look into health and wellness programs that may be available through your health insurance provider, employer, local community center, or [employer]mela club.    Weight management plan: Patient was referred to their PCP to discuss a diet and exercise plan.              Follow-ups after your visit        Follow-up notes from your care team     Return in about 2 months (around 9/12/2018).      Your next 10 appointments already scheduled     Jul 12, 2018  2:30 PM CDT   LAB with BK LAB   [facility] ([facility]    [address] [place] [id number] [phone]           Please do not eat 10-12 hours before your appointment if you are coming in fasting for labs on lipids, cholesterol, or glucose (sugar). This does not apply to pregnant women. Water, hot tea and black coffee (with nothing added) are okay. Do not drink other fluids, diet soda or chew gum.              Future tests that were ordered for you today     Open Future Orders        Priority Expected Expires Ordered     "Ferritin Routine  9/10/2018 7/12/2018            Who to contact     If you have questions or need follow up information about today's clinic visit or your schedule please contact [facility] SLEEP CLINIC directly at [phone].  Normal or non-critical lab and imaging results will be communicated to you by MyChart, letter or phone within 4 business days after the clinic has received the results. If you do not hear from us within 7 days, please contact the clinic through MyChart or phone. If you have a critical or abnormal lab result, we will notify you by phone as soon as possible.  Submit refill requests through [employer] or call your pharmacy and they will forward the refill request to us. Please allow 3 business days for your refill to be completed.          Additional Information About Your Visit        Care EveryWhere ID     This is your Care EveryWhere ID. This could be used by other organizations to access your [place] medical records  [id number]        Your Vitals Were     Pulse Height Pulse Oximetry BMI (Body Mass Index)          76 1.854 m (6' 1\") 98% 26.25 kg/m2         Blood Pressure from Last 3 Encounters:   07/12/18 136/76   05/18/18 136/83   04/05/18 138/85    Weight from Last 3 Encounters:   07/12/18 90.3 kg (199 lb)   05/18/18 90.3 kg (199 lb)   04/05/18 88.3 kg (194 lb 9.6 oz)                 Today's Medication Changes          These changes are accurate as of 7/12/18  2:18 PM.  If you have any questions, ask your nurse or doctor.               Start taking these medicines.        Dose/Directions    gabapentin 100 MG capsule   Commonly known as:  NEURONTIN   Started by:  Davis Wright PA        Take 1-3 tablets an hour before bedtime.   Quantity:  90 capsule   Refills:  1         Stop taking these medicines if you haven't already. Please contact your care team if you have questions.     albuterol 108 (90 Base) MCG/ACT Inhaler   Commonly known as:  PROAIR HFA/PROVENTIL HFA/VENTOLIN HFA "   Stopped by:  Davis Wright PA           zolpidem 5 MG tablet   Commonly known as:  AMBIEN   Stopped by:  Davis Wright PA                Where to get your medicines      These medications were sent to [facility]/pharmacy #[id number] - [place], [place] - [address]  AT CORNER OF [facility]  [address] [facility] , [facility] [address]     Phone:  [phone]     gabapentin 100 MG capsule                Primary Care Provider Office Phone # Fax #    Shiva Griffin [phone] [address] [facility] [address]        Equal Access to Services     [facility]: Hadii mara mcadams hadasho Soomaali, waaxda luqadaha, qaybta kaalmada adeegyada, abhay barroso . So [facility] [phone].    ATENCIÓN: Si habla español, tiene a szymanski disposición servicios gratuitos de asistencia lingüística. [facility] [phone].    We comply with applicable federal civil rights laws and [place] laws. We do not discriminate on the basis of race, color, national origin, age, disability, sex, sexual orientation, or gender identity.            Thank you!     Thank you for choosing [facility] SLEEP CLINIC  for your care. Our goal is always to provide you with excellent care. Hearing back from our patients is one way we can continue to improve our services. Please take a few minutes to complete the written survey that you may receive in the mail after your visit with us. Thank you!             Your Updated Medication List - Protect others around you: Learn how to safely use, store and throw away your medicines at www.disposemymeds.org.          This list is accurate as of 7/12/18  2:18 PM.  Always use your most recent med list.                   Brand Name Dispense Instructions for use Diagnosis    gabapentin 100 MG capsule    NEURONTIN    90 capsule    Take 1-3 tablets an hour before bedtime.        tadalafil 20 MG tablet    CIALIS    12 tablet    Take 1 tablet (20 mg) by mouth  daily Never use with nitroglycerin, terazosin or doxazosin.    Erectile dysfunction, unspecified erectile dysfunction type       traZODone 50 MG tablet    DESYREL     Take 50 mg by mouth At Bedtime 1-2 at bed time

## 2018-07-12 NOTE — NURSING NOTE
"Chief Complaint   Patient presents with     Study Results     psg results       Initial /76  Pulse 76  Ht 1.854 m (6' 1\")  Wt 90.3 kg (199 lb)  SpO2 98%  BMI 26.25 kg/m2 Estimated body mass index is 26.25 kg/(m^2) as calculated from the following:    Height as of this encounter: 1.854 m (6' 1\").    Weight as of this encounter: 90.3 kg (199 lb).    Medication Reconciliation: complete      "

## 2018-07-19 ENCOUNTER — TELEPHONE (OUTPATIENT)
Dept: SLEEP MEDICINE | Facility: CLINIC | Age: 58
End: 2018-07-19

## 2018-07-19 NOTE — TELEPHONE ENCOUNTER
Start taking Trazodone again. Will try to get off as we go up on Gabapentin. Reviewed side effects including sedation, advised not to mix with alcohol and other sedating medications and call with any concerns or questions.    Davis Wright PA-C

## 2018-07-19 NOTE — TELEPHONE ENCOUNTER
Reason for Call:  Other prescription    Detailed comments: was taken off trazadone and was put on gabapentin. He has trouble falling asleep now please advise    Phone Number Patient can be reached at: Cell number on file:    Telephone Information:   Mobile [phone]       Best Time: anytime     Can we leave a detailed message on this number? YES    Call taken on 7/19/2018 at 9:18 AM by Ursula Bass

## 2018-08-02 ENCOUNTER — TRANSFERRED RECORDS (OUTPATIENT)
Dept: HEALTH INFORMATION MANAGEMENT | Facility: CLINIC | Age: 58
End: 2018-08-02

## 2018-08-27 ENCOUNTER — TELEPHONE (OUTPATIENT)
Dept: SLEEP MEDICINE | Facility: CLINIC | Age: 58
End: 2018-08-27

## 2018-08-27 NOTE — TELEPHONE ENCOUNTER
Reason for Call:  Medication or medication refill:    Do you use a [facility]?  Name of the pharmacy and phone number for the current request:  [facility] [phone]    Name of the medication requested: traZODone    Other request: refill for traZODone    Can we leave a detailed message on this number? YES    Phone number patient can be reached at: Home number on file [phone] (home)    Best Time: anytime     Call taken on 8/27/2018 at 10:48 AM by Codi Austin

## 2018-08-28 RX ORDER — TRAZODONE HYDROCHLORIDE 50 MG/1
50 TABLET, FILM COATED ORAL AT BEDTIME
Qty: 90 TABLET | Refills: 1 | Status: SHIPPED | OUTPATIENT
Start: 2018-08-28 | End: 2018-11-19

## 2018-10-02 RX ORDER — GABAPENTIN 100 MG/1
CAPSULE ORAL
Qty: 90 CAPSULE | Refills: 1 | Status: SHIPPED | OUTPATIENT
Start: 2018-10-02 | End: 2018-11-05

## 2018-10-02 NOTE — TELEPHONE ENCOUNTER
Gabapentin 100 mg capsules      Last Written Prescription Date:  7-12-18  Last Fill Quantity: 90,   # refills: 1  Last Office Visit: 7-12-18  Future Office visit:     10- TAYLOR Garcia  Routing refill request to provider for review/approval because:  Drug not on the [facility], [facility] or [facility] refill protocol

## 2018-10-25 ENCOUNTER — OFFICE VISIT (OUTPATIENT)
Dept: SLEEP MEDICINE | Facility: CLINIC | Age: 58
End: 2018-10-25
Payer: COMMERCIAL

## 2018-10-25 VITALS
BODY MASS INDEX: 26.37 KG/M2 | OXYGEN SATURATION: 97 % | WEIGHT: 199 LBS | HEART RATE: 69 BPM | DIASTOLIC BLOOD PRESSURE: 80 MMHG | SYSTOLIC BLOOD PRESSURE: 138 MMHG | HEIGHT: 73 IN

## 2018-10-25 DIAGNOSIS — G47.61 PLMD (PERIODIC LIMB MOVEMENT DISORDER): ICD-10-CM

## 2018-10-25 PROCEDURE — 99214 OFFICE O/P EST MOD 30 MIN: CPT | Performed by: PHYSICIAN ASSISTANT

## 2018-10-25 NOTE — PROGRESS NOTES
"Medication Follow-Up Visit:    Chief Complaint   Patient presents with     RECHECK       Rajiv Finn comes in today for follow-up of PLMD and insomnia treated Gabapentin 200-300 mg and Trazodone 50 mg nightly. 300 mg of Gabapentin did not make a difference.     Overall, the patient reports they are doing well, \"not perfect\"  Patient is not having medication side effects.     During work days bedtime is typically 10 PM. Usually it takes about 20 minutes to fall asleep. They are typically getting out of bed at 6 AM.    On non-work days bedtime is typically 10 PM. Usually it takes about 20 minutes to fall asleep. They are typically getting out of bed at 6:30 AM.      The patient is usually getting 6-7 hours of sleep per night.  Patient is not napping.   Patient is not using caffeine.    Total score - [place]: 1 (10/25/2018  1:00 PM)    DAVID Total Score: 13    Past medical/surgical history, family history, social history, medications and allergies were reviewed.      Problem List:  Patient Active Problem List    Diagnosis Date Noted     Advanced directives, counseling/discussion 04/11/2017     Priority: Medium     Insomnia 01/19/2017     Priority: Medium     ED (erectile dysfunction) 09/17/2013     Priority: Medium     Non-Hodgkin's lymphoma (H) 10/19/2011     Priority: Medium     Health Care Home 07/13/2011     Priority: Medium     X  Unable to contact the patient at this time.Phone number is disconnecte, [facility] letter has been sent. 7/14/11. EG  DX V65.8 REPLACED WITH 69238 HEALTH CARE HOME (04/08/2013)       CARDIOVASCULAR SCREENING; LDL GOAL LESS THAN 160 10/31/2010     Priority: Medium        /88  Pulse 69  Ht 1.854 m (6' 1\")  Wt 90.3 kg (199 lb)  SpO2 97%  BMI 26.25 kg/m2    Impression/Plan:  1. Periodic limp movement disorder-  Continue gabapentin 200 mg nightly.     2. Chronic insomnia-  Continue Trazodone 50 mg nightly.     Rajiv Finn will follow up in about 1 year, sooner if " questions/concerns.       Twenty-five minutes spent with patient, all of which were spent face-to-face counseling, consulting, coordinating plan of care regarding insomnia and PLMD.      Davis Wright PA-C

## 2018-10-25 NOTE — MR AVS SNAPSHOT
After Visit Summary   10/25/2018    Rajiv Finn    MRN: [id number]           Patient Information     Date Of Birth          1960        Visit Information        Provider Department      10/25/2018 1:00 PM Davis Wright PA [facility]        Today's Diagnoses     PLMD (periodic limb movement disorder)          Care Instructions      Your BMI is Body mass index is 26.25 kg/(m^2).  Weight management is a personal decision.  If you are interested in exploring weight loss strategies, the following discussion covers the approaches that may be successful. Body mass index (BMI) is one way to tell whether you are at a healthy weight, overweight, or obese. It measures your weight in relation to your height.  A BMI of 18.5 to 24.9 is in the healthy range. A person with a BMI of 25 to 29.9 is considered overweight, and someone with a BMI of 30 or greater is considered obese. More than two-thirds of American adults are considered overweight or obese.  Being overweight or obese increases the risk for further weight gain. Excess weight may lead to heart disease and diabetes.  Creating and following plans for healthy eating and physical activity may help you improve your health.  Weight control is part of healthy lifestyle and includes exercise, emotional health, and healthy eating habits. Careful eating habits lifelong are the mainstay of weight control. Though there are significant health benefits from weight loss, long-term weight loss with diet alone may be very difficult to achieve- studies show long-term success with dietary management in less than 10% of people. Attaining a healthy weight may be especially difficult to achieve in those with severe obesity. In some cases, medications, devices and surgical management might be considered.  What can you do?  If you are overweight or obese and are interested in methods for weight loss, you should discuss this with your provider.      Consider reducing daily calorie intake by 500 calories.     Keep a food journal.     Avoiding skipping meals, consider cutting portions instead.    Diet combined with exercise helps maintain muscle while optimizing fat loss. Strength training is particularly important for building and maintaining muscle mass. Exercise helps reduce stress, increase energy, and improves fitness. Increasing exercise without diet control, however, may not burn enough calories to loose weight.       Start walking three days a week 10-20 minutes at a time    Work towards walking thirty minutes five days a week     Eventually, increase the speed of your walking for 1-2 minutes at time    In addition, we recommend that you review healthy lifestyles and methods for weight loss available through the National Institutes of Health patient information sites:  http://win.niddk.nih.gov/publications/index.htm    And look into health and wellness programs that may be available through your health insurance provider, employer, local community center, or carmela club.    Weight management plan: Patient was referred to their PCP to discuss a diet and exercise plan.              Follow-ups after your visit        Follow-up notes from your care team     Return in about 1 year (around 10/25/2019).      Who to contact     If you have questions or need follow up information about today's clinic visit or your schedule please contact [facility] directly at [phone].  Normal or non-critical lab and imaging results will be communicated to you by MyChart, letter or phone within 4 business days after the clinic has received the results. If you do not hear from us within 7 days, please contact the clinic through MyChart or phone. If you have a critical or abnormal lab result, we will notify you by phone as soon as possible.  Submit refill requests through [employer] or call your pharmacy and they will forward the refill request to us. Please  "allow 3 business days for your refill to be completed.          Additional Information About Your Visit        Care EveryWhere ID     This is your Care EveryWhere ID. This could be used by other organizations to access your [facility] medical records  [id number]        Your Vitals Were     Pulse Height Pulse Oximetry BMI (Body Mass Index)          69 1.854 m (6' 1\") 97% 26.25 kg/m2         Blood Pressure from Last 3 Encounters:   10/25/18 143/88   07/12/18 136/76   05/18/18 136/83    Weight from Last 3 Encounters:   10/25/18 90.3 kg (199 lb)   07/12/18 90.3 kg (199 lb)   05/18/18 90.3 kg (199 lb)              Today, you had the following     No orders found for display       Primary Care Provider Office Phone # Fax #    Shiva Griffin [phone] [address] [facility] [address]        Equal Access to Services     CHALINO [facility] CenterBRANDI : Hadii aad ku hadasho Soomaali, waaxda luqadaha, qaybta kaalmada adeegyada, waxay prabhjotin haylayan becca barroso . So [facility] [phone].    ATENCIÓN: Si habla español, tiene a szymanski disposición servicios gratuitos de asistencia lingüística. Llame al [phone].    We comply with applicable federal civil rights laws and [place] laws. We do not discriminate on the basis of race, color, national origin, age, disability, sex, sexual orientation, or gender identity.            Thank you!     Thank you for choosing [facility]  for your care. Our goal is always to provide you with excellent care. Hearing back from our patients is one way we can continue to improve our services. Please take a few minutes to complete the written survey that you may receive in the mail after your visit with us. Thank you!             Your Updated Medication List - Protect others around you: Learn how to safely use, store and throw away your medicines at www.disposemymeds.org.          This list is accurate as of 10/25/18  1:22 PM.  Always use your most recent med " list.                   Brand Name Dispense Instructions for use Diagnosis    gabapentin 100 MG capsule    NEURONTIN    90 capsule    Take 1-3 tablets an hour before bedtime.        tadalafil 20 MG tablet    CIALIS    12 tablet    Take 1 tablet (20 mg) by mouth daily Never use with nitroglycerin, terazosin or doxazosin.    Erectile dysfunction, unspecified erectile dysfunction type       traZODone 50 MG tablet    DESYREL    90 tablet    Take 1 tablet (50 mg) by mouth At Bedtime

## 2018-10-25 NOTE — NURSING NOTE
"Chief Complaint   Patient presents with     RECHECK       Initial There were no vitals taken for this visit. Estimated body mass index is 26.25 kg/(m^2) as calculated from the following:    Height as of 7/12/18: 1.854 m (6' 1\").    Weight as of 7/12/18: 90.3 kg (199 lb).    Medication Reconciliation: complete    "

## 2018-11-05 RX ORDER — GABAPENTIN 100 MG/1
CAPSULE ORAL
Qty: 60 CAPSULE | Refills: 3 | Status: SHIPPED | OUTPATIENT
Start: 2018-11-05 | End: 2019-03-26

## 2018-11-05 NOTE — TELEPHONE ENCOUNTER
Gabapentin      Last Written Prescription Date:  10/2/18  Last Fill Quantity: 90,   # refills: 1  Last Office Visit: 10/25/18  Future Office visit:       Routing refill request to provider for review/approval because:  Drug not on the [facility], [facility] or [facility] refill protocol

## 2018-11-19 RX ORDER — TRAZODONE HYDROCHLORIDE 50 MG/1
50 TABLET, FILM COATED ORAL AT BEDTIME
Qty: 90 TABLET | Refills: 1 | Status: SHIPPED | OUTPATIENT
Start: 2018-11-19 | End: 2019-02-06

## 2018-11-19 NOTE — TELEPHONE ENCOUNTER
Trazadone      Last Written Prescription Date:  8-28-18  Last Fill Quantity: 90,   # refills: 1  Last Office Visit: 10-  Future Office visit:    Next 5 appointments (look out 90 days)     Nov 20, 2018 11:15 AM CST   Office Visit with Shiva Griffin MD   [facility]    [address] [facility] [address]   [phone]                   Routing refill request to provider for review/approval because:  Drug not on the [facility], [facility] or [facility] refill protocol

## 2018-11-20 ENCOUNTER — OFFICE VISIT (OUTPATIENT)
Dept: FAMILY MEDICINE | Facility: CLINIC | Age: 58
End: 2018-11-20
Payer: COMMERCIAL

## 2018-11-20 VITALS
TEMPERATURE: 98.7 F | BODY MASS INDEX: 25.31 KG/M2 | OXYGEN SATURATION: 97 % | SYSTOLIC BLOOD PRESSURE: 135 MMHG | HEART RATE: 89 BPM | HEIGHT: 73 IN | WEIGHT: 191 LBS | DIASTOLIC BLOOD PRESSURE: 80 MMHG | RESPIRATION RATE: 18 BRPM

## 2018-11-20 DIAGNOSIS — Z00.8 ENCOUNTER FOR BIOMETRIC SCREENING: ICD-10-CM

## 2018-11-20 DIAGNOSIS — Z13.1 SCREENING FOR DIABETES MELLITUS: ICD-10-CM

## 2018-11-20 DIAGNOSIS — Z13.220 LIPID SCREENING: Primary | ICD-10-CM

## 2018-11-20 LAB
CHOLEST SERPL-MCNC: 232 MG/DL
GLUCOSE BLD-MCNC: 93 MG/DL (ref 70–99)
HDLC SERPL-MCNC: 67 MG/DL
LDLC SERPL CALC-MCNC: 135 MG/DL
NONHDLC SERPL-MCNC: 165 MG/DL
TRIGL SERPL-MCNC: 148 MG/DL

## 2018-11-20 PROCEDURE — 82947 ASSAY GLUCOSE BLOOD QUANT: CPT | Performed by: FAMILY MEDICINE

## 2018-11-20 PROCEDURE — 99212 OFFICE O/P EST SF 10 MIN: CPT | Performed by: FAMILY MEDICINE

## 2018-11-20 PROCEDURE — 80061 LIPID PANEL: CPT | Performed by: FAMILY MEDICINE

## 2018-11-20 PROCEDURE — 36415 COLL VENOUS BLD VENIPUNCTURE: CPT | Performed by: FAMILY MEDICINE

## 2018-11-20 ASSESSMENT — PAIN SCALES - GENERAL: PAINLEVEL: NO PAIN (0)

## 2018-11-20 NOTE — NURSING NOTE
"Chief Complaint   Patient presents with     Forms     biometric       Initial /89  Pulse 89  Temp 98.7  F (37.1  C) (Oral)  Resp 18  Ht 6' 1\" (1.854 m)  Wt 191 lb (86.6 kg)  SpO2 97%  BMI 25.2 kg/m2 Estimated body mass index is 25.2 kg/(m^2) as calculated from the following:    Height as of this encounter: 6' 1\" (1.854 m).    Weight as of this encounter: 191 lb (86.6 kg).  Medication Reconciliation: complete  Neelam [facility] M.A.    "

## 2018-11-20 NOTE — MR AVS SNAPSHOT
"              After Visit Summary   11/20/2018    Rajiv Finn    MRN: [id number]           Patient Information     Date Of Birth          1960        Visit Information        Provider Department      11/20/2018 11:15 AM Shiva Griffin MD [facility]        Today's Diagnoses     Lipid screening    -  1    Screening for diabetes mellitus        Encounter for biometric screening           Follow-ups after your visit        Follow-up notes from your care team     Return in about 6 months (around 5/20/2019) for Physical Exam.      Who to contact     If you have questions or need follow up information about today's clinic visit or your schedule please contact [facility] directly at [phone].  Normal or non-critical lab and imaging results will be communicated to you by MyChart, letter or phone within 4 business days after the clinic has received the results. If you do not hear from us within 7 days, please contact the clinic through MyChart or phone. If you have a critical or abnormal lab result, we will notify you by phone as soon as possible.  Submit refill requests through [employer] or call your pharmacy and they will forward the refill request to us. Please allow 3 business days for your refill to be completed.          Additional Information About Your Visit        Care EveryWhere ID     This is your Care EveryWhere ID. This could be used by other organizations to access your [facility] medical records  [id number]        Your Vitals Were     Pulse Temperature Respirations Height Pulse Oximetry BMI (Body Mass Index)    89 98.7  F (37.1  C) (Oral) 18 6' 1\" (1.854 m) 97% 25.2 kg/m2       Blood Pressure from Last 3 Encounters:   11/20/18 135/80   10/25/18 138/80   07/12/18 136/76    Weight from Last 3 Encounters:   11/20/18 191 lb (86.6 kg)   10/25/18 199 lb (90.3 kg)   07/12/18 199 lb (90.3 kg)              We Performed the Following     Glucose, whole blood     Lipid panel " reflex to direct LDL Fasting        Primary Care Provider Office Phone # Fax #    Shiva Griffin [phone] [address] [facility] [address]        Equal Access to Services     ROHIT CASTREJON : Hadjuanjo mara mcadams franklino Soomaali, waaxda luqadaha, qaybta kaalmada adeegyada, abhay nicolas laFarrahbehzad myers. So [facility] [phone].    ATENCIÓN: Si habla español, tiene a szymanski disposición servicios gratuitos de asistencia lingüística. Llame al [phone].    We comply with applicable federal civil rights laws and [place] laws. We do not discriminate on the basis of race, color, national origin, age, disability, sex, sexual orientation, or gender identity.            Thank you!     Thank you for choosing [facility]  for your care. Our goal is always to provide you with excellent care. Hearing back from our patients is one way we can continue to improve our services. Please take a few minutes to complete the written survey that you may receive in the mail after your visit with us. Thank you!             Your Updated Medication List - Protect others around you: Learn how to safely use, store and throw away your medicines at www.disposemymeds.org.          This list is accurate as of 11/20/18 12:59 PM.  Always use your most recent med list.                   Brand Name Dispense Instructions for use Diagnosis    gabapentin 100 MG capsule    NEURONTIN    60 capsule    Take 2 tablets an hour before bedtime.        tadalafil 20 MG tablet    CIALIS    12 tablet    Take 1 tablet (20 mg) by mouth daily Never use with nitroglycerin, terazosin or doxazosin.    Erectile dysfunction, unspecified erectile dysfunction type       traZODone 50 MG tablet    DESYREL    90 tablet    Take 1 tablet (50 mg) by mouth At Bedtime

## 2019-02-06 RX ORDER — TRAZODONE HYDROCHLORIDE 50 MG/1
50 TABLET, FILM COATED ORAL AT BEDTIME
Qty: 90 TABLET | Refills: 1 | Status: SHIPPED | OUTPATIENT
Start: 2019-02-06 | End: 2019-08-02

## 2019-02-06 NOTE — TELEPHONE ENCOUNTER
Trazadone 50 mg       Last Written Prescription Date:  11-19-18  Last Fill Quantity: 90,   # refills: 1  Last Office Visit: 10-25-18  Future Office visit:       Routing refill request to provider for review/approval because:  Drug not on the [facility], [facility] or [facility] refill protocol

## 2019-02-08 ENCOUNTER — TELEPHONE (OUTPATIENT)
Dept: FAMILY MEDICINE | Facility: CLINIC | Age: 59
End: 2019-02-08

## 2019-02-08 NOTE — TELEPHONE ENCOUNTER
Patient informed only have diagnosis of Non Hodgkins Lymphoma listed in chart.   Claudia GILLILANDN, RN, CPN

## 2019-02-08 NOTE — TELEPHONE ENCOUNTER
Patient calling, is applying for life insurance and needs to know what type of non hodgkin's Lymphoma he had. Please call to advise.

## 2019-03-11 ENCOUNTER — OFFICE VISIT (OUTPATIENT)
Dept: FAMILY MEDICINE | Facility: CLINIC | Age: 59
End: 2019-03-11
Payer: COMMERCIAL

## 2019-03-11 VITALS
BODY MASS INDEX: 25.33 KG/M2 | HEART RATE: 86 BPM | DIASTOLIC BLOOD PRESSURE: 80 MMHG | WEIGHT: 192 LBS | OXYGEN SATURATION: 98 % | TEMPERATURE: 98.2 F | SYSTOLIC BLOOD PRESSURE: 135 MMHG

## 2019-03-11 DIAGNOSIS — Z01.818 PREOP GENERAL PHYSICAL EXAM: Primary | ICD-10-CM

## 2019-03-11 DIAGNOSIS — H26.9 CATARACT OF RIGHT EYE, UNSPECIFIED CATARACT TYPE: ICD-10-CM

## 2019-03-11 PROCEDURE — 99214 OFFICE O/P EST MOD 30 MIN: CPT | Performed by: FAMILY MEDICINE

## 2019-03-11 NOTE — PROGRESS NOTES
[facility]  [address] [id number] [phone]  Dept: [phone]    PRE-OP EVALUATION:  Today's date: 3/11/2019    Rajiv Finn (: 1960) presents for pre-operative evaluation assessment as requested by Dr. Serra .  He requires evaluation and anesthesia risk assessment prior to undergoing surgery/procedure for treatment of right eye .    Fax number for surgical facility:  [facility] eye  Primary Physician: Shiva Griffin  Type of Anesthesia Anticipated: to be determined    Patient has a Health Care Directive or Living Will:  NO    Preop Questions 3/11/2019   Who is doing your surgery? [facility] eye   What are you having done? Cataract   Date of Surgery/Procedure: 3/25/19   Facility or Hospital where procedure/surgery will be performed: [facility] on [address] in [place]   1.  Do you have a history of Heart attack, stroke, stent, coronary bypass surgery, or other heart surgery? No   2.  Do you ever have any pain or discomfort in your chest? No   3.  Do you have a history of  Heart Failure? No   4.   Are you troubled by shortness of breath when:  walking on a level surface, or up a slight hill, or at night? No   5.  Do you currently have a cold, bronchitis or other respiratory infection? No   6.  Do you have a cough, shortness of breath, or wheezing? YES - chronic cough from sinuses   7.  Do you sometimes get pains in the calves of your legs when you walk? No   8. Do you or anyone in your family have previous history of blood clots? No   9.  Do you or does anyone in your family have a serious bleeding problem such as prolonged bleeding following surgeries or cuts? No   10. Have you ever had problems with anemia or been told to take iron pills? No   11. Have you had any abnormal blood loss such as black, tarry or bloody stools? No   12. Have you ever had a blood transfusion? No   13. Have you or any of your relatives ever had problems with anesthesia?  No   14. Do you have sleep apnea, excessive snoring or daytime drowsiness? No   15. Do you have any prosthetic heart valves? No   16. Do you have prosthetic joints? No         HPI:     HPI related to upcoming procedure: right eye cataract      See problem list for active medical problems.  Problems all longstanding and stable, except as noted/documented.  See ROS for pertinent symptoms related to these conditions.                                                                                                                                                          .    MEDICAL HISTORY:     Patient Active Problem List    Diagnosis Date Noted     PLMD (periodic limb movement disorder) 10/25/2018     Priority: Medium     Advanced directives, counseling/discussion 04/11/2017     Priority: Medium     Insomnia 01/19/2017     Priority: Medium     ED (erectile dysfunction) 09/17/2013     Priority: Medium     Non-Hodgkin's lymphoma (H) 10/19/2011     Priority: Medium     Health Care Home 07/13/2011     Priority: Medium     X  Unable to contact the patient at this time.Phone number is disconnecte, [facility] letter has been sent. 7/14/11. EG  DX V65.8 REPLACED WITH 11871 HEALTH CARE HOME (04/08/2013)       CARDIOVASCULAR SCREENING; LDL GOAL LESS THAN 160 10/31/2010     Priority: Medium      Past Medical History:   Diagnosis Date     Cataract 8/6/2013     [employer] Update utility     Malignant neoplasm (H)     non-Hodgkins Lymphoma     Past Surgical History:   Procedure Laterality Date     COLONOSCOPY WITH CO2 INSUFFLATION N/A 6/8/2015    Procedure: COLONOSCOPY WITH CO2 INSUFFLATION;  Surgeon: Ritesh Castle MD;  Location:  OR     NO HISTORY OF SURGERY       Current Outpatient Medications   Medication Sig Dispense Refill     gabapentin (NEURONTIN) 100 MG capsule Take 2 tablets an hour before bedtime. 60 capsule 3     tadalafil (CIALIS) 20 MG tablet Take 1 tablet (20 mg) by mouth daily Never use with nitroglycerin, terazosin or  doxazosin. 12 tablet 11     traZODone (DESYREL) 50 MG tablet Take 1 tablet (50 mg) by mouth At Bedtime 90 tablet 1     OTC products: None, except as noted above    No Known Allergies   Latex Allergy: NO    Social History     Tobacco Use     Smoking status: Never Smoker     Smokeless tobacco: Never Used   Substance Use Topics     Alcohol use: Yes     History   Drug Use No       REVIEW OF SYSTEMS:   CONSTITUTIONAL: NEGATIVE for fever, chills, change in weight  INTEGUMENTARY/SKIN: NEGATIVE for worrisome rashes, moles or lesions  EYES: NEGATIVE for vision changes or irritation  ENT/MOUTH: NEGATIVE for ear, mouth and throat problems  RESP: NEGATIVE for significant cough or SOB  CV: NEGATIVE for chest pain, palpitations or peripheral edema  GI: NEGATIVE for nausea, abdominal pain, heartburn, or change in bowel habits  : NEGATIVE for frequency, dysuria, or hematuria  MUSCULOSKELETAL: NEGATIVE for significant arthralgias or myalgia  NEURO: NEGATIVE for weakness, dizziness or paresthesias  ENDOCRINE: NEGATIVE for temperature intolerance, skin/hair changes  HEME: NEGATIVE for bleeding problems  PSYCHIATRIC: NEGATIVE for changes in mood or affect    EXAM:   /80   Pulse 86   Temp 98.2  F (36.8  C) (Oral)   Wt 87.1 kg (192 lb)   SpO2 98%   BMI 25.33 kg/m      GENERAL APPEARANCE: healthy, alert and no distress     EYES: EOMI,  PERRL     HENT: ear canals and TM's normal and nose and mouth without ulcers or lesions     NECK: no adenopathy, no asymmetry, masses, or scars and thyroid normal to palpation     RESP: lungs clear to auscultation - no rales, rhonchi or wheezes     CV: regular rates and rhythm, normal S1 S2, no S3 or S4 and no murmur, click or rub     ABDOMEN:  soft, nontender, no HSM or masses and bowel sounds normal     MS: extremities normal- no gross deformities noted, no evidence of inflammation in joints, FROM in all extremities.     SKIN: no suspicious lesions or rashes     NEURO: Normal strength and  tone, sensory exam grossly normal, mentation intact and speech normal     PSYCH: mentation appears normal. and affect normal/bright     LYMPHATICS: No cervical adenopathy    DIAGNOSTICS:   No labs or EKG required for low risk surgery (cataract, skin procedure, breast biopsy, etc)    Recent Labs   Lab Test 03/29/18  0942      POTASSIUM 4.6   CR 0.80        IMPRESSION:   Reason for surgery/procedure: cataract  Diagnosis/reason for consult: Anesthesia     The proposed surgical procedure is considered LOW risk.    REVISED CARDIAC RISK INDEX  The patient has the following serious cardiovascular risks for perioperative complications such as (MI, PE, VFib and 3  AV Block):  No serious cardiac risks  INTERPRETATION: 0 risks: Class I (very low risk - 0.4% complication rate)    The patient has the following additional risks for perioperative complications:  No identified additional risks      ICD-10-CM    1. Preop general physical exam Z01.818    2. Cataract of right eye, unspecified cataract type H26.9        RECOMMENDATIONS:         --Patient is to take all scheduled medications on the day of surgery EXCEPT for modifications listed below.    APPROVAL GIVEN to proceed with proposed procedure, without further diagnostic evaluation       Signed Electronically by: Shiva Griffin MD    Copy of this evaluation report is provided to requesting physician.    [facility] Preop Guidelines    Revised Cardiac Risk Index

## 2019-03-18 ENCOUNTER — TELEPHONE (OUTPATIENT)
Dept: FAMILY MEDICINE | Facility: CLINIC | Age: 59
End: 2019-03-18

## 2019-03-26 DIAGNOSIS — G47.61 PLMD (PERIODIC LIMB MOVEMENT DISORDER): ICD-10-CM

## 2019-03-26 RX ORDER — GABAPENTIN 100 MG/1
CAPSULE ORAL
Qty: 60 CAPSULE | Refills: 3 | Status: SHIPPED | OUTPATIENT
Start: 2019-03-26 | End: 2019-06-03

## 2019-03-26 NOTE — TELEPHONE ENCOUNTER
Pending Prescriptions:                       Disp   Refills    gabapentin (NEURONTIN) 100 MG capsule     60 cap*3            Sig: Take 2 tablets an hour before bedtime.          Last Written Prescription Date:  11-5-18  Last Fill Quantity: 60,   # refills: 3  Last Office Visit: 10-25-18  Future Office visit:       Routing refill request to provider for review/approval because:  Drug not on the [facility], P or [facility] refill protocol or controlled substance

## 2019-06-03 ENCOUNTER — TELEPHONE (OUTPATIENT)
Dept: SLEEP MEDICINE | Facility: CLINIC | Age: 59
End: 2019-06-03

## 2019-06-03 DIAGNOSIS — G47.61 PLMD (PERIODIC LIMB MOVEMENT DISORDER): ICD-10-CM

## 2019-06-03 RX ORDER — GABAPENTIN 100 MG/1
CAPSULE ORAL
Qty: 60 CAPSULE | Refills: 3 | Status: SHIPPED | OUTPATIENT
Start: 2019-06-03 | End: 2019-09-04

## 2019-06-03 NOTE — TELEPHONE ENCOUNTER
Gabapentin (Neurontin) 100 mg tab  Last Written Prescription Date:  3/26/19  Last Fill Quantity: 60,   # refills: 3  Last Office Visit: 10/25/18  Future Office visit:   RTC 1 year     Routing refill request to provider for review/approval because:  Drug not on the [facility], P or [facility] refill protocol or controlled substance

## 2019-06-03 NOTE — TELEPHONE ENCOUNTER
Pt. Called and is requesting a refill on his gabapentin 100 mg capsules. He would like his refill to be sent to the [facility].       Ashlyn Nava on 6/3/2019 at 9:54 AM'

## 2019-08-02 RX ORDER — TRAZODONE HYDROCHLORIDE 50 MG/1
50 TABLET, FILM COATED ORAL AT BEDTIME
Qty: 90 TABLET | Refills: 1 | Status: SHIPPED | OUTPATIENT
Start: 2019-08-02 | End: 2020-03-12

## 2019-08-02 NOTE — TELEPHONE ENCOUNTER
Pending Prescriptions:                       Disp   Refills    traZODone (DESYREL) 50 MG tablet          90 tab*1            Sig: Take 1 tablet (50 mg) by mouth At Bedtime      Last Written Prescription Date:  02/06/19  Last Fill Quantity: 90,   # refills: 1  Last Office Visit: 10/25/18  Future Office visit:       Routing refill request to provider for review/approval because:  Drug not on the [facility], P or [facility] refill protocol or controlled substance

## 2019-09-04 RX ORDER — GABAPENTIN 100 MG/1
CAPSULE ORAL
Qty: 60 CAPSULE | Refills: 3 | Status: SHIPPED | OUTPATIENT
Start: 2019-09-04 | End: 2019-12-02

## 2019-09-04 NOTE — TELEPHONE ENCOUNTER
Pending Prescriptions:                       Disp   Refills    gabapentin (NEURONTIN) 100 MG capsule     60 cap*3            Sig: Take 2 tablets an hour before bedtime.          Last Written Prescription Date:  06/03/2019  Last Fill Quantity: 60,   # refills: 3  Last Office Visit: 10/25/2018  Future Office visit:       Routing refill request to provider for review/approval because:  Drug not on the [facility], P or [facility] refill protocol or controlled substance

## 2019-12-02 RX ORDER — GABAPENTIN 100 MG/1
CAPSULE ORAL
Qty: 60 CAPSULE | Refills: 3 | Status: SHIPPED | OUTPATIENT
Start: 2019-12-02 | End: 2020-02-19

## 2019-12-02 NOTE — TELEPHONE ENCOUNTER
Pending Prescriptions:                       Disp   Refills    gabapentin (NEURONTIN) 100 MG capsule     60 cap*3            Sig: Take 2 tablets an hour before bedtime.          Last Written Prescription Date:  9-4-19  Last Fill Quantity: 60,   # refills: 3  Last Office Visit: 10-25-18  Future Office visit:       Routing refill request to provider for review/approval because:  Drug not on the [facility], P or [facility] refill protocol or controlled substance

## 2020-01-15 ENCOUNTER — TELEPHONE (OUTPATIENT)
Dept: FAMILY MEDICINE | Facility: CLINIC | Age: 60
End: 2020-01-15

## 2020-01-15 DIAGNOSIS — N52.9 ERECTILE DYSFUNCTION, UNSPECIFIED ERECTILE DYSFUNCTION TYPE: Primary | Chronic | ICD-10-CM

## 2020-01-15 RX ORDER — SILDENAFIL CITRATE 20 MG/1
TABLET ORAL
Qty: 30 TABLET | Refills: 4 | Status: SHIPPED | OUTPATIENT
Start: 2020-01-15 | End: 2020-01-23

## 2020-01-15 NOTE — TELEPHONE ENCOUNTER
Per verbal order read back Dr. Shiva Griffin:  Ok to send prescription for sildenafil 20mg; take 1-5 tablets one hour prior to intercourse.  #30 with 4 refills.  Lianne Avalos RN  Called patient and let him know prescription sent to pharmacy.  Lianne Avalos RN

## 2020-01-15 NOTE — TELEPHONE ENCOUNTER
Reason for Call:  Medication or medication refill:    Do you use a [facility]?  Name of the pharmacy and phone number for the current request:  [facility] [phone]    Name of the medication requested: Viagra-generic    Other request: patient states last time patient was seen with provider offered, but patient declined. Patient would like to take provider up on offer for RX above. Please call to discuss. Thank you.    Can we leave a detailed message on this number? YES    Phone number patient can be reached at: Home number on file [phone] (home)    Best Time:     Call taken on 1/15/2020 at 10:11 AM by Tabatha Reynoso

## 2020-01-15 NOTE — TELEPHONE ENCOUNTER
Dr. Shiva Griffin;  Please sign in agreement of verbal order for the sildenafil.  Then close message.  Thank you.  Lianne Avalos RN

## 2020-01-23 ENCOUNTER — TELEPHONE (OUTPATIENT)
Dept: FAMILY MEDICINE | Facility: CLINIC | Age: 60
End: 2020-01-23

## 2020-01-23 DIAGNOSIS — N52.9 ERECTILE DYSFUNCTION, UNSPECIFIED ERECTILE DYSFUNCTION TYPE: Chronic | ICD-10-CM

## 2020-01-23 RX ORDER — SILDENAFIL CITRATE 20 MG/1
TABLET ORAL
Qty: 90 TABLET | Refills: 4 | Status: SHIPPED | OUTPATIENT
Start: 2020-01-23 | End: 2020-06-25

## 2020-01-23 NOTE — TELEPHONE ENCOUNTER
Patient has already used prescription sent on 1/15. Uses 3 tablets at a time  Would like prescription sent to pharmacy for higher quantity so does not have to  refills so often  Patient states he does pay for out of pocket so insurance coverage is not an issue      Claudia GILLILANDN, RN, CPN

## 2020-01-23 NOTE — TELEPHONE ENCOUNTER
Reason for Call:  Medication or medication refill:    Do you use a [facility]?  Name of the pharmacy and phone number for the current request:  [facility] [phone]    Name of the medication requested: Generic viagra    Other request: Will be out tomorrow    Can we leave a detailed message on this number? YES    Phone number patient can be reached at: Home number on file [phone] (home)    Best Time:     Call taken on 1/23/2020 at 8:37 AM by Danelle Mckoy

## 2020-01-28 NOTE — PROGRESS NOTES
3  SUBJECTIVE:   CC: Rajiv Finn is an 59 year old male who presents for preventive health visit.     Healthy Habits:    Answers for HPI/ROS submitted by the patient on 2/19/2020   Annual Exam:  Frequency of exercise:: None  Getting at least 3 servings of Calcium per day:: NO  Diet:: Regular (no restrictions)  Taking medications regularly:: Yes  Medication side effects:: Not applicable  Bi-annual eye exam:: Yes  Dental care twice a year:: Yes  Sleep apnea or symptoms of sleep apnea:: None  abdominal pain: No  Blood in stool: No  Blood in urine: No  chest pain: No  chills: No  congestion: No  constipation: No  cough: Yes  diarrhea: No  dizziness: No  ear pain: No  eye pain: No  nervous/anxious: No  fever: No  frequency: No  genital sores: No  headaches: No  hearing loss: No  heartburn: No  arthralgias: No  joint swelling: No  peripheral edema: No  mood changes: No  myalgias: Yes  nausea: No  dysuria: No  palpitations: No  Skin sensation changes: No  sore throat: No  urgency: No  rash: No  shortness of breath: No  visual disturbance: No  weakness: No  impotence: Yes  penile discharge: No  Additional concerns today:: No                Today's PHQ-2 Score:   PHQ-2 ( 1999 Pf[employer]) 2/19/2020 11/20/2018   Q1: Little interest or pleasure in doing things 0 0   Q2: Feeling down, depressed or hopeless 0 0   PHQ-2 Score 0 0   Q1: Little interest or pleasure in doing things Not at all -   Q2: Feeling down, depressed or hopeless Not at all -   PHQ-2 Score 0 -       Abuse: Current or Past(Physical, Sexual or Emotional)- No  Do you feel safe in your environment? Yes        Social History     Tobacco Use     Smoking status: Never Smoker     Smokeless tobacco: Never Used   Substance Use Topics     Alcohol use: Yes     Comment: OCC     If you drink alcohol do you typically have >3 drinks per day or >7 drinks per week? No                      Last PSA: No results found for: PSA    Reviewed orders with patient. Reviewed health  "maintenance and updated orders accordingly - Yes        Reviewed and updated as needed this visit by clinical staff  Tobacco  Allergies  Meds  Med Hx  Surg Hx  Fam Hx  Soc Hx        Reviewed and updated as needed this visit by Provider            ROS:  CONSTITUTIONAL: NEGATIVE for fever, chills, change in weight  INTEGUMENTARY/SKIN: NEGATIVE for worrisome rashes, moles or lesions  EYES: NEGATIVE for vision changes or irritation  ENT: NEGATIVE for ear, mouth and throat problems  RESP: NEGATIVE for significant cough or SOB  CV: NEGATIVE for chest pain, palpitations or peripheral edema  GI: NEGATIVE for nausea, abdominal pain, heartburn, or change in bowel habits   male: negative for dysuria, hematuria, decreased urinary stream, erectile dysfunction, urethral discharge  MUSCULOSKELETAL: NEGATIVE for significant arthralgias or myalgia  NEURO: NEGATIVE for weakness, dizziness or paresthesias  PSYCHIATRIC: NEGATIVE for changes in mood or affect    OBJECTIVE:   BP (!) 142/72   Pulse 94   Temp 98.2  F (36.8  C) (Oral)   Resp 16   Ht 1.842 m (6' 0.5\")   Wt 88 kg (194 lb)   SpO2 97%   BMI 25.95 kg/m    EXAM:  GENERAL: healthy, alert and no distress  EYES: Eyes grossly normal to inspection, PERRL and conjunctivae and sclerae normal  HENT: ear canals and TM's normal, nose and mouth without ulcers or lesions  NECK: no adenopathy, no asymmetry, masses, or scars and thyroid normal to palpation  RESP: lungs clear to auscultation - no rales, rhonchi or wheezes  CV: regular rate and rhythm, normal S1 S2, no S3 or S4, no murmur, click or rub, no peripheral edema and peripheral pulses strong  ABDOMEN: soft, nontender, no hepatosplenomegaly, no masses and bowel sounds normal  MS: no gross musculoskeletal defects noted, no edema  SKIN: no suspicious lesions or rashes  NEURO: Normal strength and tone, mentation intact and speech normal  PSYCH: mentation appears normal, affect normal/bright    Diagnostic Test Results:  Labs " "reviewed in Epic    ASSESSMENT/PLAN:       ICD-10-CM    1. Routine general medical examination at a health care facility Z00.00    2. Lipid screening Z13.220 Lipid panel reflex to direct LDL Fasting   3. Screening for diabetes mellitus Z13.1 Glucose, whole blood     Lose weight and exercise. And recheck blood pressure Follow up in 3 months   COUNSELING:  Reviewed preventive health counseling, as reflected in patient instructions       Regular exercise       Healthy diet/nutrition    Estimated body mass index is 25.95 kg/m  as calculated from the following:    Height as of this encounter: 1.842 m (6' 0.5\").    Weight as of this encounter: 88 kg (194 lb).    Weight management plan: less beer     reports that he has never smoked. He has never used smokeless tobacco.      Counseling Resources:  ATP IV Guidelines  Pooled Cohorts Equation Calculator  FRAX Risk Assessment  ICSI Preventive Guidelines  Dietary Guidelines for Americans, 2010  USDA's MyPlate  ASA Prophylaxis  Lung CA Screening    Shiva Griffin MD  [facility]  "

## 2020-02-19 ENCOUNTER — ANCILLARY PROCEDURE (OUTPATIENT)
Dept: GENERAL RADIOLOGY | Facility: CLINIC | Age: 60
End: 2020-02-19
Attending: FAMILY MEDICINE
Payer: COMMERCIAL

## 2020-02-19 ENCOUNTER — OFFICE VISIT (OUTPATIENT)
Dept: FAMILY MEDICINE | Facility: CLINIC | Age: 60
End: 2020-02-19
Payer: COMMERCIAL

## 2020-02-19 VITALS
RESPIRATION RATE: 16 BRPM | DIASTOLIC BLOOD PRESSURE: 72 MMHG | SYSTOLIC BLOOD PRESSURE: 142 MMHG | BODY MASS INDEX: 25.71 KG/M2 | HEIGHT: 73 IN | HEART RATE: 94 BPM | WEIGHT: 194 LBS | OXYGEN SATURATION: 97 % | TEMPERATURE: 98.2 F

## 2020-02-19 DIAGNOSIS — Z13.1 SCREENING FOR DIABETES MELLITUS: ICD-10-CM

## 2020-02-19 DIAGNOSIS — C85.92 NON-HODGKIN LYMPHOMA OF INTRATHORACIC LYMPH NODES, UNSPECIFIED NON-HODGKIN LYMPHOMA TYPE (H): ICD-10-CM

## 2020-02-19 DIAGNOSIS — Z13.220 LIPID SCREENING: ICD-10-CM

## 2020-02-19 DIAGNOSIS — Z00.00 ROUTINE GENERAL MEDICAL EXAMINATION AT A HEALTH CARE FACILITY: Primary | ICD-10-CM

## 2020-02-19 LAB
CHOLEST SERPL-MCNC: 212 MG/DL
GLUCOSE BLD-MCNC: 96 MG/DL (ref 70–99)
HDLC SERPL-MCNC: 77 MG/DL
LDLC SERPL CALC-MCNC: 119 MG/DL
NONHDLC SERPL-MCNC: 135 MG/DL
TRIGL SERPL-MCNC: 82 MG/DL

## 2020-02-19 PROCEDURE — 99396 PREV VISIT EST AGE 40-64: CPT | Performed by: FAMILY MEDICINE

## 2020-02-19 PROCEDURE — 36415 COLL VENOUS BLD VENIPUNCTURE: CPT | Performed by: FAMILY MEDICINE

## 2020-02-19 PROCEDURE — 71046 X-RAY EXAM CHEST 2 VIEWS: CPT

## 2020-02-19 PROCEDURE — 80061 LIPID PANEL: CPT | Performed by: FAMILY MEDICINE

## 2020-02-19 PROCEDURE — 82947 ASSAY GLUCOSE BLOOD QUANT: CPT | Performed by: FAMILY MEDICINE

## 2020-02-19 ASSESSMENT — ENCOUNTER SYMPTOMS
SHORTNESS OF BREATH: 0
PALPITATIONS: 0
HEMATOCHEZIA: 0
MYALGIAS: 1
CONSTIPATION: 0
PARESTHESIAS: 0
EYE PAIN: 0
ARTHRALGIAS: 0
ABDOMINAL PAIN: 0
DYSURIA: 0
CHILLS: 0
WEAKNESS: 0
DIZZINESS: 0
JOINT SWELLING: 0
NAUSEA: 0
FREQUENCY: 0
COUGH: 1
NERVOUS/ANXIOUS: 0
DIARRHEA: 0
FEVER: 0
HEARTBURN: 0
SORE THROAT: 0
HEADACHES: 0
HEMATURIA: 0

## 2020-02-19 ASSESSMENT — MIFFLIN-ST. JEOR: SCORE: 1735.92

## 2020-02-20 ENCOUNTER — TELEPHONE (OUTPATIENT)
Dept: FAMILY MEDICINE | Facility: CLINIC | Age: 60
End: 2020-02-20

## 2020-02-20 NOTE — TELEPHONE ENCOUNTER
Form completed and signed by the provider and faxed to # [phone]. Original form mailed to patient's home address: [address] [place] [address].  AMANDO Moralez

## 2020-02-28 ENCOUNTER — TELEPHONE (OUTPATIENT)
Dept: FAMILY MEDICINE | Facility: CLINIC | Age: 60
End: 2020-02-28

## 2020-03-12 ENCOUNTER — VIRTUAL VISIT (OUTPATIENT)
Dept: FAMILY MEDICINE | Facility: OTHER | Age: 60
End: 2020-03-12

## 2020-03-12 NOTE — PROGRESS NOTES
"Date: 2020 08:18:59  Clinician: Kofi Welch  Clinician NPI: [id number]  Patient: Rajiv Finn  Patient : 1960  Patient Address: [address] [place] [address]  Patient Phone: [phone]  Visit Protocol: URI  Patient Summary:  Rajiv is a 59 year old ( : 1960 ) male who initiated a Visit for cold, sinus infection, or influenza. When asked the question \"Please sign me up to receive news, health information and promotions from [employer].\", Rajiv responded \"No\".    Rajiv states his symptoms started gradually 3-6 days ago. After his symptoms started, they improved and then got worse again.   His symptoms consist of malaise, a cough, nasal congestion, and chills.   Symptom details     Nasal secretions: The color of his mucus is yellow and blood-tinged.    Cough: Rajiv coughs a few times an hour and his cough is not more bothersome at night. Phlegm comes into his throat when he coughs. He believes his cough is caused by post-nasal drip. The color of the phlegm is blood-tinged and yellow.      Rajiv denies having rhinitis, wheezing, ear pain, sore throat, fever, headache, teeth pain, enlarged lymph nodes, facial pain or pressure, and myalgias. He also denies taking antibiotic medication for the symptoms, having recent facial or sinus surgery in the past 60 days, and having a sinus infection within the past year. He is not experiencing dyspnea.   Precipitating events  He has not recently been exposed to someone with influenza. Rajiv has not been in close contact with any high risk individuals.   Pertinent COVID-19 (Coronavirus) information  Rajiv has not traveled internationally in the last 14 days before the start of his symptoms.   Rajiv has not had close contact with a laboratory confirmed positive COVID-19 patient within 14 days of symptom onset.   Pertinent medical history  Rajiv does not need a return to work/school note.   Weight: 200 lbs   Rajiv does not smoke or use " smokeless tobacco.   Additional information as reported by the patient (free text): I traveled to [place] for a convention with 7 co-workers. 4 of us came down with the same thing. Sunday night I had a temp of 99.1 so I stayed home Monday. Went in for a partial day Tuesday and Wednesday. Others stayed home. The past few nights I've been a little chilled. I think it's because of clamminess. I showered and was ready to go to work this morning when I checked my temp and it  was back to 99.1. It was normal Tuesday and Wednesday.   Weight: 200 lbs    MEDICATIONS: gabapentin oral, trazodone oral, ALLERGIES: NKDA  Clinician Response:  Dear Rajiv,  Based on the information provided, you have an influenza-like illness. This is an infection that has the same symptoms of the flu, but the specific virus is not known. Lab testing would be required to confirm the flu virus, but this is often not necessary because the treatment will be the same no matter what is causing your symptoms.  Your symptoms should improve gradually over the next week.  Medication information  The CDC recommends treatment for flu only if antiviral medications can be started within 48 hours of the first flu symptoms or if you fall into one of the high-risk groups that are more likely to get flu complications.  Since you do not meet guidelines for treatment with antiviral medications, antiviral treatment is not recommended for you. Treatment focuses on controlling your symptoms.  Unless you are allergic to the over-the-counter medication(s) below, I recommend using:     Ibuprofen (Advil or store brand) 200 mg oral tablet. Take 1-3 tablets (200-600 mg) by mouth every 8 hours to help with the discomfort. Make sure to take the ibuprofen with food. Do not exceed 2400 mg in 24 hours.   Over-the-counter medications do not require a prescription. Ask the pharmacist if you have any questions.  Self care  The following tips will keep you as comfortable as possible  while you recover:     Rest    Drink plenty of water and other liquids    Take a hot shower to loosen congestion    Take a spoonful of honey to reduce your cough     If you have a fever, stay home until your temperature has returned to normal for 24 hours and you feel well enough for daily activities. And of course, wash your hands often to prevent spreading the flu and other illnesses. However, the best way to prevent the flu is to get a flu shot before each flu season.  When to seek care  Please be seen in a clinic or urgent care if new symptoms develop, or symptoms become worse.   Diagnosis: Influenza-like illness  Diagnosis ICD: J11.1  Additional Clinician Notes: Rajiv, it sounds like you have a flu like illness. usually the fever with these lasts about 3-5 days. I would expect things to be improving over the coming couple of days. I am not suspicious of coronavirus and would not recommend testing.  Would have you stay out of work until you are fever free. Likely you would be able to return Monday. I included some COVID information FYI.

## 2020-03-12 NOTE — PROGRESS NOTES
"Date: 2020 09:13:09  Clinician: Dee Dee Su  Clinician NPI: [id number]  Patient: Rajiv Finn  Patient : 1960  Patient Address: [address] [place] [address]  Patient Phone: [phone]  Visit Protocol: URI  Patient Summary:  Rajiv is a 59 year old ( : 1960 ) male who initiated a Visit for COVID-19 (Coronavirus) evaluation and screening. When asked the question \"Please sign me up to receive news, health information and promotions from [employer].\", Rajiv responded \"No\".    Rajiv states his symptoms started gradually 3-6 days ago. After his symptoms started, they improved and then got worse again.   His symptoms consist of malaise, a cough, nasal congestion, and chills. Rajiv also feels feverish.   Symptom details     Nasal secretions: The color of his mucus is yellow and blood-tinged.    Cough: Rajiv coughs a few times an hour and his cough is not more bothersome at night. Phlegm comes into his throat when he coughs. He believes his cough is caused by post-nasal drip. The color of the phlegm is blood-tinged and yellow.     Temperature: His current temperature is 99.1 degrees Fahrenheit.      Rajiv denies having rhinitis, wheezing, ear pain, sore throat, headache, teeth pain, enlarged lymph nodes, facial pain or pressure, and myalgias. He also denies taking antibiotic medication for the symptoms, having recent facial or sinus surgery in the past 60 days, and having a sinus infection within the past year. He is not experiencing dyspnea.   Precipitating events  He has not recently been exposed to someone with influenza. Rajiv has not been in close contact with any high risk individuals.   Pertinent COVID-19 (Coronavirus) information  Rajiv has not traveled internationally in the last 14 days before the start of his symptoms.   Rajiv has not had close contact with a laboratory confirmed positive COVID-19 patient within 14 days of symptom onset. 99.1   Pertinent medical " history  Rajiv does not need a return to work/school note.   Weight: 200 lbs   Rajiv does not smoke or use smokeless tobacco.   Weight: 200 lbs    MEDICATIONS: gabapentin oral, trazodone oral, ALLERGIES: NKDA  Clinician Response:  Dear Rajiv,     Dear Rajiv Finn,  Based on the information you have provided, it is recommended that you go to one of our designated Corona Virus 19 testing centers to get a test done from your car. To do this follow these instructions:  You should go to one of our dedicated testing centers as soon as possible during the hours below at one of these locations:   Walk-in Care: [facility] at [address], [place] [address]. Hours: M-F 7am - 6pm, Sat-Sun 8am -- 3pm  [facility] at [address] [address]. Hours: Every Day 9am -- 8pm  Walk-in Care: [facility] at [address] [place] [address]. Hours: M-F 7am - 6pm, Sat-Sun 8am -- 3pm  [facility] [place] [address] [place] [address]. Hours: M-F 11am -- 8pm, Sat-Sun 9am-4pm   What to expect:   When you arrive please come park in the parking lot.  Call [phone] and let them know which of the four clinics you are at, description of your car and where you are parked. Mention you did an [facility] visit and were sent for testing.  They will add you to the queue to get your test (you will stay in your car the entire time).  On that phone call you will give them the information to register you for the visit.  You will then be met by a provider who will perform a brief assessment in your car and collect samples to send for Corona Virus 19, influenza and possibly RSV.  You will be given patient information about respiratory illnesses and instructions about signing up for mychart to get your results as quickly as possible or we will call you with the results if you are not on mychart.   Isolate Yourself:   Isolate yourself while  traveling.  Do Not allow any visitors within 6 feet.  Do Not go to work or school.  Do Not go to [employer],  centers, shopping, or other public places.  Do Not shake hands.  Avoid close contact with others (hugging, kissing).   Protect Others:   Cover Your Mouth and Nose with a mask, disposable tissue or wash cloth to avoid spreading germs to others.  Wash your hands and face frequently with soap and water   Fever Medicines:   For fever relief, take acetaminophen or ibuprofen.  Treat fevers above 101deg F (38.3deg C) to lower fevers and make you more comfortable.  Acetaminophen (e.g., Tylenol): Take 650 mg (two 325 mg pills) by mouth every 4-6 hours as needed of regular strength Tylenol or 1,000 mg (two 500 mg pills) every 8 hours as needed of Extra Strength Tylenol.  Ibuprofen (e.g., Motrin, Advil): Take 400 mg (two 200 mg pills) by mouth every 6 hours as needed.  Acetaminophen is thought to be safer than ibuprofen or naproxen for people over 65 years old. Acetaminophen is in many OTC and prescription medicines. It might be in more than one medicine that you are taking. You need to be careful and not take an overdose. Before taking any medicine, read all the instructions on the package.  Caution -NSAIDs (e.g., ibuprofen, naproxen): Do not take nonsteroidal anti-inflammatory drugs (NSAIDs) if you have stomach problems, kidney disease, heart failure, or other contraindications to using this type of medicine. Do not take NSAID medicines for over 7 days without consulting your PCP. Do not take NSAID medicines if you are pregnant. Do not take NSAID medicines if you are also taking blood thinners.   Call Back If: Breathing difficulty develops or you become worse.  Thank you for limiting contact with others, wearing a simple mask to cover your cough, practice good hand hygiene habits and accessing our virtual services where possible to limit the spread of this virus.  For more information about COVID19 and options  for caring for yourself at home, please visit the CDC website at https://www.cdc.gov/coronavirus/2019-ncov/about/steps-when-sick.html  For more options for care at [facility], please visit our website at [email or web address]    Diagnosis: Cough  Diagnosis ICD: R05

## 2020-04-08 ENCOUNTER — TELEPHONE (OUTPATIENT)
Dept: SLEEP MEDICINE | Facility: CLINIC | Age: 60
End: 2020-04-08

## 2020-04-08 DIAGNOSIS — G47.61 PLMD (PERIODIC LIMB MOVEMENT DISORDER): ICD-10-CM

## 2020-04-08 RX ORDER — GABAPENTIN 100 MG/1
100 CAPSULE ORAL AT BEDTIME
Qty: 60 CAPSULE | Refills: 3 | Status: SHIPPED | OUTPATIENT
Start: 2020-04-08 | End: 2020-05-13

## 2020-04-08 NOTE — TELEPHONE ENCOUNTER
LOV 10/25/18. Last refill date 12/2/19. Patient called to get a refill on his Gabapentin. I see it has been discontinued. E has an upcoming appt with Stephen Huntley 5/14/2020. Can he get the refill or should he not be taking this medication? Thanks.

## 2020-05-01 VITALS — BODY MASS INDEX: 25.84 KG/M2 | WEIGHT: 195 LBS | HEIGHT: 73 IN

## 2020-05-01 ASSESSMENT — MIFFLIN-ST. JEOR: SCORE: 1748.39

## 2020-05-01 NOTE — PROGRESS NOTES
"Rajiv Finn is a 59 year old male who is being evaluated via a billable video visit.      The patient has been notified of following:     \"This video visit will be conducted via a call between you and your physician/provider. We have found that certain health care needs can be provided without the need for an in-person physical exam.  This service lets us provide the care you need with a video conversation.  If a prescription is necessary we can send it directly to your pharmacy.  If lab work is needed we can place an order for that and you can then stop by our lab to have the test done at a later time.    Video visits are billed at different rates depending on your insurance coverage.  Please reach out to your insurance provider with any questions.    If during the course of the call the physician/provider feels a video visit is not appropriate, you will not be charged for this service.\"    Patient has given verbal consent for Video visit? Yes    How would you like to obtain your AVS? Mail a copy    Patient would like the video invitation sent by: Send to e-mail at: [email or web address]    Will anyone else be joining your video visit? No      Video-Visit Details    Type of service:  Video Visit    Video Start Time: 2:10 PM  Video End Time: 2:22    Originating Location (pt. Location): Home    Distant Location (provider location):  [facility]     Platform used for Video Visit: Doximity      Medication Follow-Up Visit:    Chief Complaint   Patient presents with     Medication Recheck       Rajiv Finn comes in today for follow-up of PLMD treated with gabapentin and chronic insomnia treated with Trazodone.       No specialty comments available.    Overall, the patient reports they are doing fair.  Patient is not having medication side effects.     During work days bedtime is typically 10 PM. Usually it takes about 15 minutes to fall asleep. Without medications it takes hours.They are typically getting " "out of bed at 6 AM.    On non-work days bedtime is typically 10 PM. Usually it takes about 15 minutes to fall asleep. They are typically getting out of bed at 6 AM.      The patient is usually getting 6.5 hours of sleep per night.  Patient is not napping.   Patient is using caffeine.  Patient is not  taking dug holidays      Total score - [place]: 0 (5/1/2020 11:00 AM)    No data recorded    Past medical/surgical history, family history, social history, medications and allergies were reviewed.      Problem List:  Patient Active Problem List    Diagnosis Date Noted     PLMD (periodic limb movement disorder) 10/25/2018     Priority: Medium     Advanced directives, counseling/discussion 04/11/2017     Priority: Medium     Insomnia 01/19/2017     Priority: Medium     ED (erectile dysfunction) 09/17/2013     Priority: Medium     Non-Hodgkin's lymphoma (H) 10/19/2011     Priority: Medium     Health Care Home 07/13/2011     Priority: Medium     X  Unable to contact the patient at this time.Phone number is disconnecte, [facility] letter has been sent. 7/14/11. EG  DX V65.8 REPLACED WITH [id number] HEALTH CARE HOME (04/08/2013)       CARDIOVASCULAR SCREENING; LDL GOAL LESS THAN 160 10/31/2010     Priority: Medium        Ht 1.854 m (6' 1\")   Wt 88.5 kg (195 lb)   BMI 25.73 kg/m      Impression/Plan:  1. Periodic limp movement disorder-  Sleep is refreshing.   Continue gabapentin 200 mg nightly.      2. Chronic insomnia-  Doing well on Trazodone with significantly shortened sleep latency.   Continue Trazodone 50 mg nightly.      He will discuss with PCP to see if they want to take over prescribing these medications.     Davis Wright PA-C    "

## 2020-05-01 NOTE — PATIENT INSTRUCTIONS
Your BMI is Body mass index is 25.73 kg/m .  Weight management is a personal decision.  If you are interested in exploring weight loss strategies, the following discussion covers the approaches that may be successful. Body mass index (BMI) is one way to tell whether you are at a healthy weight, overweight, or obese. It measures your weight in relation to your height.  A BMI of 18.5 to 24.9 is in the healthy range. A person with a BMI of 25 to 29.9 is considered overweight, and someone with a BMI of 30 or greater is considered obese. More than two-thirds of American adults are considered overweight or obese.  Being overweight or obese increases the risk for further weight gain. Excess weight may lead to heart disease and diabetes.  Creating and following plans for healthy eating and physical activity may help you improve your health.  Weight control is part of healthy lifestyle and includes exercise, emotional health, and healthy eating habits. Careful eating habits lifelong are the mainstay of weight control. Though there are significant health benefits from weight loss, long-term weight loss with diet alone may be very difficult to achieve- studies show long-term success with dietary management in less than 10% of people. Attaining a healthy weight may be especially difficult to achieve in those with severe obesity. In some cases, medications, devices and surgical management might be considered.  What can you do?  If you are overweight or obese and are interested in methods for weight loss, you should discuss this with your provider.     Consider reducing daily calorie intake by 500 calories.     Keep a food journal.     Avoiding skipping meals, consider cutting portions instead.    Diet combined with exercise helps maintain muscle while optimizing fat loss. Strength training is particularly important for building and maintaining muscle mass. Exercise helps reduce stress, increase energy, and improves fitness.  Increasing exercise without diet control, however, may not burn enough calories to loose weight.       Start walking three days a week 10-20 minutes at a time    Work towards walking thirty minutes five days a week     Eventually, increase the speed of your walking for 1-2 minutes at time    In addition, we recommend that you review healthy lifestyles and methods for weight loss available through the National Institutes of Health patient information sites:  http://win.niddk.nih.gov/publications/index.htm    And look into health and wellness programs that may be available through your health insurance provider, employer, local community center, or carmela club.    Weight management plan: Patient was referred to their PCP to discuss a diet and exercise plan.

## 2020-05-04 ENCOUNTER — OFFICE VISIT (OUTPATIENT)
Dept: SLEEP MEDICINE | Facility: CLINIC | Age: 60
End: 2020-05-04
Payer: COMMERCIAL

## 2020-05-04 DIAGNOSIS — G47.61 PLMD (PERIODIC LIMB MOVEMENT DISORDER): ICD-10-CM

## 2020-05-04 PROCEDURE — 99213 OFFICE O/P EST LOW 20 MIN: CPT | Performed by: PHYSICIAN ASSISTANT

## 2020-05-13 ENCOUNTER — TELEPHONE (OUTPATIENT)
Dept: SLEEP MEDICINE | Facility: CLINIC | Age: 60
End: 2020-05-13

## 2020-05-13 DIAGNOSIS — G47.61 PLMD (PERIODIC LIMB MOVEMENT DISORDER): ICD-10-CM

## 2020-05-13 RX ORDER — GABAPENTIN 100 MG/1
CAPSULE ORAL
Qty: 60 CAPSULE | Refills: 2 | Status: SHIPPED | OUTPATIENT
Start: 2020-05-13 | End: 2020-06-11

## 2020-05-13 NOTE — TELEPHONE ENCOUNTER
Patient called today.    Patient has been taking medication Gapapentin for 2 years prescribed by Davis VAZQUEZ at  [facility].    Patient is out of medication.    Patient would like to pickup at [facility].    Patient has been taking 2 tabs a day, and has realized this AM, that the medication bottle states 1 tab a day.    Patient is confused on dosage.    Please contact patient today.    Thank you.    Central Scheduling  Naomi HUMPHREY

## 2020-05-13 NOTE — TELEPHONE ENCOUNTER
Spoke with patient as he states for years he has been taking Gabapentin 2 tabs before bedtime his new prescription from 4/8/2020 is only 1 tab before bedtime which has now caused him to be out of medication. He would like clarification from provider what dose should he be taking?        Reji Sultana  [facility] Specialist - [place]

## 2020-06-11 ENCOUNTER — TELEPHONE (OUTPATIENT)
Dept: SLEEP MEDICINE | Facility: CLINIC | Age: 60
End: 2020-06-11

## 2020-06-11 DIAGNOSIS — G47.61 PLMD (PERIODIC LIMB MOVEMENT DISORDER): ICD-10-CM

## 2020-06-11 RX ORDER — GABAPENTIN 100 MG/1
CAPSULE ORAL
Qty: 180 CAPSULE | Refills: 2 | Status: SHIPPED | OUTPATIENT
Start: 2020-06-11 | End: 2021-02-03

## 2020-06-11 NOTE — TELEPHONE ENCOUNTER
Pt is requesting a 90 refill for his gabapentin (NEURONTIN) 100 MG capsule  To be sent to his pharmacy.     Neena Eng MA on 6/11/2020 at 9:02 AM

## 2020-06-23 DIAGNOSIS — N52.9 ERECTILE DYSFUNCTION, UNSPECIFIED ERECTILE DYSFUNCTION TYPE: Chronic | ICD-10-CM

## 2020-06-25 RX ORDER — SILDENAFIL CITRATE 20 MG/1
TABLET ORAL
Qty: 90 TABLET | Refills: 4 | Status: SHIPPED | OUTPATIENT
Start: 2020-06-25 | End: 2021-02-03

## 2020-08-24 ENCOUNTER — TRANSFERRED RECORDS (OUTPATIENT)
Dept: HEALTH INFORMATION MANAGEMENT | Facility: CLINIC | Age: 60
End: 2020-08-24

## 2020-09-11 ENCOUNTER — TRANSFERRED RECORDS (OUTPATIENT)
Dept: HEALTH INFORMATION MANAGEMENT | Facility: CLINIC | Age: 60
End: 2020-09-11

## 2020-09-18 ENCOUNTER — TRANSFERRED RECORDS (OUTPATIENT)
Dept: HEALTH INFORMATION MANAGEMENT | Facility: CLINIC | Age: 60
End: 2020-09-18

## 2020-11-30 ENCOUNTER — TELEPHONE (OUTPATIENT)
Dept: FAMILY MEDICINE | Facility: CLINIC | Age: 60
End: 2020-11-30

## 2020-11-30 NOTE — TELEPHONE ENCOUNTER
Patient calling to get a refill of gabapentin and tramadol.     [facility]    Please call and let know.

## 2020-11-30 NOTE — TELEPHONE ENCOUNTER
Dr. Shiva Griffin:  Are you willing to follow patient and refill the trazodone and gabapentin for him when needed in the future? Doesn't need from you yet.   Per 5/4/20 sleep medicine Office Visit note:    Impression/Plan:  1. Periodic limb movement disorder-  Sleep is refreshing.   Continue gabapentin 200 mg nightly.      2. Chronic insomnia-  Doing well on Trazodone with significantly shortened sleep latency.   Continue Trazodone 50 mg nightly.       He will discuss with PCP to see if they want to take over prescribing these medications.      Davis Wright PA-C    Per Edna at [facility] pharmacy in [place].  Patient has refills available on both the gabapentin and the trazodone.  Can be refilled on 12/3.  Patient states understanding and will .  Lianne Avalos RN

## 2020-11-30 NOTE — TELEPHONE ENCOUNTER
His blood pressure is elevated and should be seen.  Is there some reason the presciber can not refill these medications?

## 2020-11-30 NOTE — TELEPHONE ENCOUNTER
Per 5/4/20 sleep medicine Office Visit note:    Impression/Plan:  1. Periodic limp movement disorder-  Sleep is refreshing.   Continue gabapentin 200 mg nightly.      2. Chronic insomnia-  Doing well on Trazodone with significantly shortened sleep latency.   Continue Trazodone 50 mg nightly.       He will discuss with PCP to see if they want to take over prescribing these medications.      Davis Wright PA-C

## 2020-12-12 ENCOUNTER — HEALTH MAINTENANCE LETTER (OUTPATIENT)
Age: 60
End: 2020-12-12

## 2021-02-03 ENCOUNTER — OFFICE VISIT (OUTPATIENT)
Dept: FAMILY MEDICINE | Facility: CLINIC | Age: 61
End: 2021-02-03
Payer: COMMERCIAL

## 2021-02-03 VITALS
WEIGHT: 193 LBS | HEIGHT: 73 IN | HEART RATE: 89 BPM | RESPIRATION RATE: 16 BRPM | SYSTOLIC BLOOD PRESSURE: 133 MMHG | TEMPERATURE: 97.8 F | BODY MASS INDEX: 25.58 KG/M2 | OXYGEN SATURATION: 97 % | DIASTOLIC BLOOD PRESSURE: 80 MMHG

## 2021-02-03 DIAGNOSIS — N52.9 ERECTILE DYSFUNCTION, UNSPECIFIED ERECTILE DYSFUNCTION TYPE: ICD-10-CM

## 2021-02-03 DIAGNOSIS — G47.00 INSOMNIA, UNSPECIFIED TYPE: Primary | Chronic | ICD-10-CM

## 2021-02-03 PROCEDURE — 99214 OFFICE O/P EST MOD 30 MIN: CPT | Performed by: FAMILY MEDICINE

## 2021-02-03 RX ORDER — TRAZODONE HYDROCHLORIDE 50 MG/1
50 TABLET, FILM COATED ORAL AT BEDTIME
Qty: 90 TABLET | Refills: 1 | Status: SHIPPED | OUTPATIENT
Start: 2021-02-03 | End: 2021-10-13

## 2021-02-03 RX ORDER — TADALAFIL 20 MG/1
20 TABLET ORAL DAILY PRN
Qty: 20 TABLET | Refills: 1 | Status: SHIPPED | OUTPATIENT
Start: 2021-02-03 | End: 2021-02-10

## 2021-02-03 RX ORDER — TADALAFIL 10 MG/1
10 TABLET ORAL DAILY PRN
Qty: 90 TABLET | Refills: 1 | Status: SHIPPED | OUTPATIENT
Start: 2021-02-03 | End: 2021-02-10

## 2021-02-03 RX ORDER — GABAPENTIN 100 MG/1
CAPSULE ORAL
Qty: 180 CAPSULE | Refills: 2 | Status: SHIPPED | OUTPATIENT
Start: 2021-02-03 | End: 2022-06-14

## 2021-02-03 ASSESSMENT — MIFFLIN-ST. JEOR: SCORE: 1734.32

## 2021-02-03 NOTE — PATIENT INSTRUCTIONS
Say Good Night to Insomnia. Read Say Good Night to Insomnia.  Consider stopping trazodone to see if eliminated ED. Consider Cialis.

## 2021-02-03 NOTE — PROGRESS NOTES
"SUBJECTIVE:  60 year old.The patient has a complaint of insomnia and ED. This started year ago. He has seen and had a sleep strudy  .  Brought on by chemo .  Better with trazadone and gabapentin.       Reviewed health maintenance  Patient Active Problem List   Diagnosis     CARDIOVASCULAR SCREENING; LDL GOAL LESS THAN 160     Health Care Home     Non-Hodgkin's lymphoma (H)     ED (erectile dysfunction)     Advanced directives, counseling/discussion     Insomnia     PLMD (periodic limb movement disorder)     Past Medical History:   Diagnosis Date     Cataract 8/6/2013     [employer] Update utility     Malignant neoplasm (H)     non-Hodgkins Lymphoma       OBJECTIVE:  no apparent distress  /80   Pulse 89   Temp 97.8  F (36.6  C) (Tympanic)   Resp 16   Ht 1.854 m (6' 1\")   Wt 87.5 kg (193 lb)   SpO2 97%   BMI 25.46 kg/m      LUNGS:  CTA B/L, no wheezing or crackles.   Cardiovascular: negative, PMI normal. No lifts, heaves, or thrills. RRR. No murmurs, clicks gallops or rub   Gastrointestinal: Abdomen soft, non-tender. BS normal. No masses, organomegaly       ICD-10-CM    1. Insomnia, unspecified type  G47.00 traZODone (DESYREL) 50 MG tablet     gabapentin (NEURONTIN) 100 MG capsule   2. Erectile dysfunction, unspecified erectile dysfunction type  N52.9     PLAN:  Read Say Good Night to Insomnia.  Consider stopping trazodone to see if eliminated ED. Consider Cialis.      "

## 2021-02-04 ENCOUNTER — TELEPHONE (OUTPATIENT)
Dept: FAMILY MEDICINE | Facility: CLINIC | Age: 61
End: 2021-02-04

## 2021-02-04 NOTE — TELEPHONE ENCOUNTER
Reason for call: Patient calling and states that he did not received an AVS from his visit yesterday with the website information that Dr Griffin wanted the patient to have regarding his Cialis     Patient  can be reached at: [phone]    Call taken at 820 am on 2/4/2021    Sasha Loco MA  [facility]  2nd Floor  Primary Care

## 2021-02-05 NOTE — TELEPHONE ENCOUNTER
I spoke to the patient and I read Dr. Griffin's note as written.  The patient has not checked his MyChart yet but will do that.  If he cannot find the information he is looking for he will send a eGoodt message to Dr. Griffin.  Mirna Barnett,

## 2021-02-07 ENCOUNTER — MYC MEDICAL ADVICE (OUTPATIENT)
Dept: FAMILY MEDICINE | Facility: CLINIC | Age: 61
End: 2021-02-07

## 2021-02-07 DIAGNOSIS — N52.9 ERECTILE DYSFUNCTION, UNSPECIFIED ERECTILE DYSFUNCTION TYPE: ICD-10-CM

## 2021-02-08 ENCOUNTER — MYC MEDICAL ADVICE (OUTPATIENT)
Dept: FAMILY MEDICINE | Facility: CLINIC | Age: 61
End: 2021-02-08

## 2021-02-08 DIAGNOSIS — N52.9 ERECTILE DYSFUNCTION, UNSPECIFIED ERECTILE DYSFUNCTION TYPE: ICD-10-CM

## 2021-02-08 NOTE — TELEPHONE ENCOUNTER
Dr. Shiva Griffin:  Please advise.  Were you attaching a website with information on obtaining ED med for this patient? Or discount on med?  I cannot find on either the 2/3/21 or 2/4/21 AVS (see 2/3/21 visit).  Patient is trying to get the information.  See [employer] message.  Lianne LI RN

## 2021-02-10 RX ORDER — TADALAFIL 20 MG/1
20 TABLET ORAL DAILY PRN
Qty: 90 TABLET | Refills: 1 | Status: SHIPPED | OUTPATIENT
Start: 2021-02-10 | End: 2021-12-22

## 2021-02-10 RX ORDER — TADALAFIL 10 MG/1
10 TABLET ORAL DAILY PRN
Qty: 90 TABLET | Refills: 1 | OUTPATIENT
Start: 2021-02-10

## 2021-04-11 ENCOUNTER — HEALTH MAINTENANCE LETTER (OUTPATIENT)
Age: 61
End: 2021-04-11

## 2021-08-02 ENCOUNTER — TRANSFERRED RECORDS (OUTPATIENT)
Dept: HEALTH INFORMATION MANAGEMENT | Facility: CLINIC | Age: 61
End: 2021-08-02

## 2021-09-26 ENCOUNTER — HEALTH MAINTENANCE LETTER (OUTPATIENT)
Age: 61
End: 2021-09-26

## 2021-10-13 DIAGNOSIS — G47.00 INSOMNIA, UNSPECIFIED TYPE: Chronic | ICD-10-CM

## 2021-10-13 DIAGNOSIS — G47.61 PLMD (PERIODIC LIMB MOVEMENT DISORDER): ICD-10-CM

## 2021-10-13 RX ORDER — TRAZODONE HYDROCHLORIDE 50 MG/1
50 TABLET, FILM COATED ORAL AT BEDTIME
Qty: 90 TABLET | Refills: 1 | Status: SHIPPED | OUTPATIENT
Start: 2021-10-13 | End: 2022-03-02

## 2021-10-13 NOTE — TELEPHONE ENCOUNTER
Last Written Prescription Date:  03/08/2021  Last Filled Date: 07/15/2021  Last Fill Quantity: 90,   # refills: 1  Last Office Visit with [facility], [facility] or [facility] prescribing provider: 5/4/2020 with Davis Wright PA-C  Future Office visit:  None scheduled. Becca Palacios, CMA

## 2021-12-22 ENCOUNTER — MYC MEDICAL ADVICE (OUTPATIENT)
Dept: FAMILY MEDICINE | Facility: CLINIC | Age: 61
End: 2021-12-22
Payer: COMMERCIAL

## 2021-12-22 DIAGNOSIS — N52.9 ERECTILE DYSFUNCTION, UNSPECIFIED ERECTILE DYSFUNCTION TYPE: ICD-10-CM

## 2021-12-22 RX ORDER — TADALAFIL 20 MG/1
20 TABLET ORAL DAILY PRN
Qty: 90 TABLET | Refills: 1 | Status: SHIPPED | OUTPATIENT
Start: 2021-12-22 | End: 2022-07-08

## 2021-12-22 NOTE — TELEPHONE ENCOUNTER
Medication pended for local print.     Health Maintenance Due   Topic Date Due     ANNUAL REVIEW OF HM ORDERS  Never done     COVID-19 Vaccine (1) Never done     Pneumococcal Vaccine: Pediatrics (0 to 5 Years) and At-Risk Patients (6 to 64 Years) (1 of 4 - PCV13) Never done     HIV SCREENING  Never done     ZOSTER IMMUNIZATION (1 of 2) Never done     PREVENTIVE CARE VISIT  02/19/2021     INFLUENZA VACCINE (1) 09/01/2021     Vianca Howard RN

## 2021-12-22 NOTE — TELEPHONE ENCOUNTER
Hi Jose Luis,     I have placed the RX that you have requested at the  for you to .     Kristin ANDERSON - [place]

## 2022-02-16 ENCOUNTER — OFFICE VISIT (OUTPATIENT)
Dept: FAMILY MEDICINE | Facility: CLINIC | Age: 62
End: 2022-02-16
Payer: COMMERCIAL

## 2022-02-16 VITALS
TEMPERATURE: 98.2 F | SYSTOLIC BLOOD PRESSURE: 138 MMHG | OXYGEN SATURATION: 94 % | HEART RATE: 96 BPM | DIASTOLIC BLOOD PRESSURE: 72 MMHG | WEIGHT: 179.8 LBS | BODY MASS INDEX: 24.35 KG/M2 | HEIGHT: 72 IN

## 2022-02-16 DIAGNOSIS — Z13.1 SCREENING FOR DIABETES MELLITUS: ICD-10-CM

## 2022-02-16 DIAGNOSIS — Z13.220 LIPID SCREENING: Primary | ICD-10-CM

## 2022-02-16 DIAGNOSIS — Z00.00 ROUTINE GENERAL MEDICAL EXAMINATION AT A HEALTH CARE FACILITY: ICD-10-CM

## 2022-02-16 DIAGNOSIS — C85.92 NON-HODGKIN LYMPHOMA OF INTRATHORACIC LYMPH NODES, UNSPECIFIED NON-HODGKIN LYMPHOMA TYPE (H): ICD-10-CM

## 2022-02-16 PROCEDURE — 99396 PREV VISIT EST AGE 40-64: CPT | Performed by: FAMILY MEDICINE

## 2022-02-16 ASSESSMENT — ENCOUNTER SYMPTOMS
FEVER: 0
DYSURIA: 0
ARTHRALGIAS: 0
DIARRHEA: 0
COUGH: 1
MYALGIAS: 0
NERVOUS/ANXIOUS: 0
NAUSEA: 0
ABDOMINAL PAIN: 0
FREQUENCY: 0
CHILLS: 0
PARESTHESIAS: 0
HEMATOCHEZIA: 0
HEADACHES: 0
WEAKNESS: 0
DIZZINESS: 0
HEMATURIA: 0
JOINT SWELLING: 0
PALPITATIONS: 0
HEARTBURN: 0
EYE PAIN: 0
SHORTNESS OF BREATH: 0
SORE THROAT: 0
CONSTIPATION: 0

## 2022-02-16 NOTE — PROGRESS NOTES
SUBJECTIVE:   CC: Rajiv Finn is an 61 year old male who presents for preventative health visit.        Patient has been advised of split billing requirements and indicates understanding: Yes  Healthy Habits:     Getting at least 3 servings of Calcium per day:  Yes    Bi-annual eye exam:  NO    Dental care twice a year:  Yes    Sleep apnea or symptoms of sleep apnea:  None    Diet:  Regular (no restrictions)    Frequency of exercise:  None    Taking medications regularly:  Yes    Medication side effects:  Not applicable    PHQ-2 Total Score: 1    Additional concerns today:  No               Today's PHQ-2 Score:   PHQ-2 ( 1999 Pfizer) 2/16/2022   Q1: Little interest or pleasure in doing things 0   Q2: Feeling down, depressed or hopeless 1   PHQ-2 Score 1   PHQ-2 Total Score (12-17 Years)- Positive if 3 or more points; Administer PHQ-A if positive -   Q1: Little interest or pleasure in doing things Not at all   Q2: Feeling down, depressed or hopeless Several days   PHQ-2 Score 1       Abuse: Current or Past(Physical, Sexual or Emotional)- No  Do you feel safe in your environment? Yes        Social History     Tobacco Use     Smoking status: Never Smoker     Smokeless tobacco: Never Used   Substance Use Topics     Alcohol use: Yes     Comment: OCC         Alcohol Use 2/16/2022   Prescreen: >3 drinks/day or >7 drinks/week? Yes   Prescreen: >3 drinks/day or >7 drinks/week? -   AUDIT SCORE  4       Last PSA: No results found for: PSA    Reviewed orders with patient. Reviewed health maintenance and updated orders accordingly - Yes       Reviewed and updated as needed this visit by clinical staff   Tobacco  Allergies  Meds   Med Hx  Surg Hx  Fam Hx  Soc Hx        Reviewed and updated as needed this visit by Provider                     Review of Systems   Constitutional: Negative for chills and fever.   HENT: Positive for congestion and hearing loss. Negative for ear pain and sore throat.    Eyes: Negative for  "pain and visual disturbance.   Respiratory: Positive for cough. Negative for shortness of breath.    Cardiovascular: Negative for chest pain, palpitations and peripheral edema.   Gastrointestinal: Negative for abdominal pain, constipation, diarrhea, heartburn, hematochezia and nausea.   Genitourinary: Positive for impotence. Negative for dysuria, frequency, genital sores, hematuria, penile discharge and urgency.   Musculoskeletal: Negative for arthralgias, joint swelling and myalgias.   Skin: Negative for rash.   Neurological: Negative for dizziness, weakness, headaches and paresthesias.   Psychiatric/Behavioral: Negative for mood changes. The patient is not nervous/anxious.        OBJECTIVE:   /72   Pulse 96   Temp 98.2  F (36.8  C) (Oral)   Ht 1.816 m (5' 11.5\")   Wt 81.6 kg (179 lb 12.8 oz)   SpO2 94%   BMI 24.73 kg/m      Physical Exam  GENERAL: healthy, alert and no distress  EYES: Eyes grossly normal to inspection, PERRL and conjunctivae and sclerae normal  HENT: ear canals and TM's normal, nose and mouth without ulcers or lesions  NECK: no adenopathy, no asymmetry, masses, or scars and thyroid normal to palpation  RESP: lungs clear to auscultation - no rales, rhonchi or wheezes  CV: regular rate and rhythm, normal S1 S2, no S3 or S4, no murmur, click or rub, no peripheral edema and peripheral pulses strong  ABDOMEN: soft, nontender, no hepatosplenomegaly, no masses and bowel sounds normal  MS: no gross musculoskeletal defects noted, no edema  SKIN: no suspicious lesions or rashes  NEURO: Normal strength and tone, mentation intact and speech normal  PSYCH: mentation appears normal, affect normal/bright    Diagnostic Test Results:  pending    ASSESSMENT/PLAN:       ICD-10-CM    1. Lipid screening  Z13.220 Lipid panel reflex to direct LDL Fasting   2. Screening for diabetes mellitus  Z13.1 **Comprehensive metabolic panel FUTURE 2mo   3. Non-Hodgkin lymphoma of intrathoracic lymph nodes, unspecified " "non-Hodgkin lymphoma type (H)  C85.92 **CBC with platelets differential FUTURE 2mo   4. Routine general medical examination at a health care facility  Z00.00        Patient has been advised of split billing requirements and indicates understanding: Yes     COUNSELING:   Reviewed preventive health counseling, as reflected in patient instructions       Regular exercise       Healthy diet/nutrition    Estimated body mass index is 24.73 kg/m  as calculated from the following:    Height as of this encounter: 1.816 m (5' 11.5\").    Weight as of this encounter: 81.6 kg (179 lb 12.8 oz).         He reports that he has never smoked. He has never used smokeless tobacco.      Counseling Resources:  ATP IV Guidelines  Pooled Cohorts Equation Calculator  FRAX Risk Assessment  ICSI Preventive Guidelines  Dietary Guidelines for Americans, 2010  USDA's MyPlate  ASA Prophylaxis  Lung CA Screening    Shiva Griffin MD  [facility]  "

## 2022-03-02 DIAGNOSIS — G47.61 PLMD (PERIODIC LIMB MOVEMENT DISORDER): ICD-10-CM

## 2022-03-02 DIAGNOSIS — G47.00 INSOMNIA, UNSPECIFIED TYPE: Chronic | ICD-10-CM

## 2022-03-02 RX ORDER — TRAZODONE HYDROCHLORIDE 50 MG/1
50 TABLET, FILM COATED ORAL AT BEDTIME
Qty: 90 TABLET | Refills: 0 | Status: SHIPPED | OUTPATIENT
Start: 2022-03-02 | End: 2022-06-22

## 2022-03-02 NOTE — TELEPHONE ENCOUNTER
Last Written Prescription Date:  10/13/2021  Last Fill Quantity: 90,   # refills: 1  Last Office Visit with [facility], [facility] or  Health prescribing provider: Davis Wright PA-C  Future Office visit:   None

## 2022-06-13 ENCOUNTER — MYC MEDICAL ADVICE (OUTPATIENT)
Dept: FAMILY MEDICINE | Facility: CLINIC | Age: 62
End: 2022-06-13
Payer: COMMERCIAL

## 2022-06-13 DIAGNOSIS — G47.00 INSOMNIA, UNSPECIFIED TYPE: Chronic | ICD-10-CM

## 2022-06-14 RX ORDER — GABAPENTIN 100 MG/1
CAPSULE ORAL
Qty: 180 CAPSULE | Refills: 2 | Status: SHIPPED | OUTPATIENT
Start: 2022-06-14 | End: 2023-03-08

## 2022-06-20 DIAGNOSIS — G47.61 PLMD (PERIODIC LIMB MOVEMENT DISORDER): Primary | ICD-10-CM

## 2022-06-20 DIAGNOSIS — G47.00 INSOMNIA, UNSPECIFIED TYPE: Chronic | ICD-10-CM

## 2022-06-20 RX ORDER — TRAZODONE HYDROCHLORIDE 50 MG/1
50 TABLET, FILM COATED ORAL AT BEDTIME
Qty: 90 TABLET | Refills: 0 | Status: CANCELLED | OUTPATIENT
Start: 2022-06-20

## 2022-06-20 NOTE — TELEPHONE ENCOUNTER
LOV: 05/04/2020  CMA to call to schedule follow-up visit.     Will check with provider to see how we should proceed with additional refills.     Laila Bryan RN on 6/20/2022 at 9:05 AM

## 2022-06-20 NOTE — TELEPHONE ENCOUNTER
Called patient to inform him that we refilled a 90 day supply of his prescription, but that he will have to be seen prior to any other prescriptions being sent in. He said he did not ask us for this refill. He said that his primary was taking over this rx and the pharmacy sent the request to the wrong provider.  He said he will take care of it. Told him either way is ok, but it he wants us to fill we will need to see him. He said ok thanks. Becca Palacios, CMA

## 2022-06-20 NOTE — TELEPHONE ENCOUNTER
Trazodone 50 MG Tablets      Last Written Prescription Date:  3/22/2022  Last Fill Quantity: 90,   # refills: 0  Last Office Visit: 5/4/2020  Future Office visit:   None Scheduled  Routing refill request to provider for review/approval because:  Drug not on the [facility], [facility] or [facility] refill protocol or controlled substance    Can we send in #90 and then inform patient that he has to be seen before any further refills?  Becca Palacios, CMA

## 2022-06-20 NOTE — TELEPHONE ENCOUNTER
Refill cancelled as patient is having primary take over medications.     Laila Bryan RN on 6/20/2022 at 10:02 AM

## 2022-06-22 ENCOUNTER — TRANSFERRED RECORDS (OUTPATIENT)
Dept: HEALTH INFORMATION MANAGEMENT | Facility: CLINIC | Age: 62
End: 2022-06-22

## 2022-06-22 DIAGNOSIS — G47.00 INSOMNIA, UNSPECIFIED TYPE: Chronic | ICD-10-CM

## 2022-06-22 RX ORDER — TRAZODONE HYDROCHLORIDE 50 MG/1
TABLET, FILM COATED ORAL
Qty: 90 TABLET | Refills: 0 | Status: SHIPPED | OUTPATIENT
Start: 2022-06-22 | End: 2022-09-22

## 2022-06-22 NOTE — TELEPHONE ENCOUNTER
We received another fax from [facility]. Called the pharmacy and informed them (again) that patient does not want/need Davis Wright PA-C to fill his Trazodone.  His PCP said he would take it over and the request needs to be sent to them. Pharmacist said they would update his profile.   Becca Palacios, [facility]

## 2022-07-08 ENCOUNTER — MYC MEDICAL ADVICE (OUTPATIENT)
Dept: FAMILY MEDICINE | Facility: CLINIC | Age: 62
End: 2022-07-08

## 2022-07-08 DIAGNOSIS — N52.9 ERECTILE DYSFUNCTION, UNSPECIFIED ERECTILE DYSFUNCTION TYPE: ICD-10-CM

## 2022-07-08 RX ORDER — TADALAFIL 20 MG/1
20 TABLET ORAL DAILY PRN
Qty: 90 TABLET | Refills: 1 | Status: SHIPPED | OUTPATIENT
Start: 2022-07-08 | End: 2023-02-07

## 2022-08-20 ENCOUNTER — MYC MEDICAL ADVICE (OUTPATIENT)
Dept: FAMILY MEDICINE | Facility: CLINIC | Age: 62
End: 2022-08-20

## 2022-08-20 ENCOUNTER — NURSE TRIAGE (OUTPATIENT)
Dept: NURSING | Facility: CLINIC | Age: 62
End: 2022-08-20

## 2022-08-20 DIAGNOSIS — U07.1 INFECTION DUE TO 2019 NOVEL CORONAVIRUS: Primary | ICD-10-CM

## 2022-08-20 NOTE — TELEPHONE ENCOUNTER
Nurse Triage SBAR    Is this a 2nd Level Triage? No - patient transferred for [facility] appt.    Situation/Background: Patient is calling to report that he tested positive for Covid19 on 8/19/22. Symptoms started Thursday, 8/18/22, with cough, chills, sweating (was afebrile), shortness of breath which has mostly resolved, nasal congestion. Patient states he has a history of low WBC's after treatment for NHL in 2001.    Assessment:   Monoclonal Antibody Administration    You may be eligible to receive a new treatment with a monoclonal antibody for preventing hospitalization in patients at high risk for complications from COVID-19. This medication is still experimental and available on a limited basis; it is given through an IV and must be given at an infusion center. Please note that not all people who are eligible will receive the medication since it is in limited supply.  Is the patient symptomatic and onset of symptoms within the last 7 days?  Yes  Is the patient interested in a visit with a provider to discuss treatment options?: Yes  Is the patient seen at [facility]?  No: Warm transfer caller to [phone] to be scheduled with a virtual urgent provider.  During transfer, instruct  on appropriate time frame for visit     Review information with Patient    Your result was positive. This means you have COVID-19 (coronavirus).      How can I protect others?    These guidelines are for isolating before returning to work, school or .       If you DO have symptoms:  o Stay home and away from others  - For at least 5 days after your symptoms started, AND   - You are fever free for 24 hours (with no medicine that reduces fever), AND  - Your other symptoms are better.  o Wear a mask for 10 full days any time you are around others.    If you DON'T have symptoms:  o Stay at home and away from others for at least 5 days after your positive test.  o Wear a mask for 10 full days any time you are around  others.    There may be different guidelines for healthcare facilities. Please check with the specific sites before arriving.     If you've been told by a doctor that you were severely ill with COVID-19 or are immunocompromised, you should isolate for at least 10 days.    You should not go back to work until you meet the guidelines above for ending your home isolation. You don't need to be retested for COVID-19 before going back to work--studies show that you won't spread the virus if it's been at least 10 days since your symptoms started (or 20 days, if you have a weak immune system).    How can I take care of myself?      Get lots of rest. Drink extra fluids (unless a doctor has told you not to).      Take Tylenol (acetaminophen) for fever or pain. If you have liver or kidney problems, ask your family doctor if it's okay to take Tylenol.     Take either:     650 mg (two 325 mg pills) every 4 to 6 hours, or     1,000 mg (two 500 mg pills) every 8 hours as needed.     Note: Do not take more than 3,000 mg in one day. Acetaminophen is found in many medicines (both prescribed and over-the-counter medicines). Read all labels to be sure you don't take too much.    For children, check the Tylenol bottle for the right dose (based on their age or weight).      If you have other health problems (like cancer, heart failure, an organ transplant or severe kidney disease): Call your specialty clinic if you don't feel better in the next 2 days.      Know when to call 911: Emergency warning signs include:    Trouble breathing or shortness of breath    Pain or pressure in the chest that doesn't go away    Feeling confused like you haven't felt before, or not being able to wake up    Bluish-colored lips or face        Recommendation: Per disposition, Call [facility]CP Now. Home care advice provided. Advised patient to call back with any new or worsening symptoms. Patient verbalized understanding and agrees with plan. Transferred patient to  scheduling for Covid treatment visit.      Encounter routed to PCP Care Team as ERNIE.     Protocol Recommended Disposition: Paged/Called Provider    Amanda Almeida RN on 8/20/2022 at 9:37 AM    Reason for Disposition    [1] HIGH RISK for severe COVID complications (e.g., weak immune system, age > 64 years, obesity with BMI > 25, pregnant, chronic lung disease or other chronic medical condition) AND [2] COVID symptoms (e.g., cough, fever)  (Exceptions: Already seen by PCP and no new or worsening symptoms.)    Additional Information    Negative: SEVERE difficulty breathing (e.g., struggling for each breath, speaks in single words)    Negative: Difficult to awaken or acting confused (e.g., disoriented, slurred speech)    Negative: Bluish (or gray) lips or face now    Negative: Shock suspected (e.g., cold/pale/clammy skin, too weak to stand, low BP, rapid pulse)    Negative: Sounds like a life-threatening emergency to the triager    Negative: [1] Diagnosed or suspected COVID-19 AND [2] symptoms lasting 3 or more weeks    Negative: [1] COVID-19 exposure AND [2] no symptoms    Negative: COVID-19 vaccine reaction suspected (e.g., fever, headache, muscle aches) occurring 1 to 3 days after getting vaccine    Negative: COVID-19 vaccine, questions about    Negative: [1] Lives with someone known to have influenza (flu test positive) AND [2] flu-like symptoms (e.g., cough, runny nose, sore throat, SOB; with or without fever)    Negative: [1] Adult with possible COVID-19 symptoms AND [2] triager concerned about severity of symptoms or other causes    Negative: COVID-19 and breastfeeding, questions about    Negative: SEVERE or constant chest pain or pressure  (Exception: Mild central chest pain, present only when coughing.)    Negative: MODERATE difficulty breathing (e.g., speaks in phrases, SOB even at rest, pulse 100-120)    Negative: [1] Headache AND [2] stiff neck (can't touch chin to chest)    Negative: Oxygen level (e.g.,  pulse oximetry) 90 percent or lower     Not available    Negative: Chest pain or pressure    Negative: Patient sounds very sick or weak to the triager    Negative: MILD difficulty breathing (e.g., minimal/no SOB at rest, SOB with walking, pulse <100)    Negative: Fever > 103 F (39.4 C)    Negative: [1] Fever > 101 F (38.3 C) AND [2] age > 60 years    Negative: [1] Fever > 100.0 F (37.8 C) AND [2] bedridden (e.g., nursing home patient, CVA, chronic illness, recovering from surgery)    Protocols used: CORONAVIRUS (COVID-19) DIAGNOSED OR [id number]

## 2022-12-22 DIAGNOSIS — G47.00 INSOMNIA, UNSPECIFIED TYPE: Chronic | ICD-10-CM

## 2022-12-22 RX ORDER — TRAZODONE HYDROCHLORIDE 50 MG/1
TABLET, FILM COATED ORAL
Qty: 90 TABLET | Refills: 0 | Status: SHIPPED | OUTPATIENT
Start: 2022-12-22 | End: 2023-03-18

## 2023-01-08 ENCOUNTER — HEALTH MAINTENANCE LETTER (OUTPATIENT)
Age: 63
End: 2023-01-08

## 2023-02-07 ENCOUNTER — MYC MEDICAL ADVICE (OUTPATIENT)
Dept: FAMILY MEDICINE | Facility: CLINIC | Age: 63
End: 2023-02-07
Payer: COMMERCIAL

## 2023-02-07 DIAGNOSIS — N52.9 ERECTILE DYSFUNCTION, UNSPECIFIED ERECTILE DYSFUNCTION TYPE: ICD-10-CM

## 2023-02-07 RX ORDER — TADALAFIL 20 MG/1
20 TABLET ORAL DAILY PRN
Qty: 90 TABLET | Refills: 1 | OUTPATIENT
Start: 2023-02-07

## 2023-02-08 RX ORDER — TADALAFIL 20 MG/1
20 TABLET ORAL DAILY PRN
Qty: 90 TABLET | Refills: 1 | Status: SHIPPED | OUTPATIENT
Start: 2023-02-08 | End: 2023-02-16

## 2023-02-15 NOTE — TELEPHONE ENCOUNTER
Dr. Griffin,       Can you sign a new RX? I don't have this one for some reason so not sure what happened to it. I will send it as soon as it is completed. Sorry for any issues!      Vimal Howard

## 2023-02-15 NOTE — TELEPHONE ENCOUNTER
- prescription was printed on 2/8. Please locate as patient wants to be able to mail this to [facility]      Claudia TRAMMELL, RN

## 2023-02-16 RX ORDER — TADALAFIL 20 MG/1
20 TABLET ORAL DAILY PRN
Qty: 90 TABLET | Refills: 1 | Status: SHIPPED | OUTPATIENT
Start: 2023-02-16 | End: 2023-02-16

## 2023-02-16 RX ORDER — TADALAFIL 20 MG/1
20 TABLET ORAL DAILY PRN
Qty: 90 TABLET | Refills: 1 | Status: SHIPPED | OUTPATIENT
Start: 2023-02-16 | End: 2023-08-25

## 2023-02-16 NOTE — TELEPHONE ENCOUNTER
Was this signed and placed in basket? Been keeping an eye out all morning and haven't seen it yet. Sorry for being annoying.      Vimal Howard

## 2023-02-16 NOTE — TELEPHONE ENCOUNTER
Called and informed patient, putting in mail.      Vimal Howard       Pre-Operative Diagnosis: Subcutaneous skin cyst left thigh (Cystic lesion left hip nodule firm hard bluish 2.5 cm)     Post-Operative Diagnosis: Subcutaneous skin cyst left thigh (Cystic lesion left hip nodule firm hard bluish 2.5 cm)          Procedure Pe

## 2023-03-08 DIAGNOSIS — G47.00 INSOMNIA, UNSPECIFIED TYPE: Chronic | ICD-10-CM

## 2023-03-08 RX ORDER — GABAPENTIN 100 MG/1
CAPSULE ORAL
Qty: 180 CAPSULE | Refills: 0 | Status: SHIPPED | OUTPATIENT
Start: 2023-03-08 | End: 2023-06-08

## 2023-03-18 DIAGNOSIS — G47.00 INSOMNIA, UNSPECIFIED TYPE: Chronic | ICD-10-CM

## 2023-03-18 RX ORDER — TRAZODONE HYDROCHLORIDE 50 MG/1
TABLET, FILM COATED ORAL
Qty: 60 TABLET | Refills: 0 | Status: SHIPPED | OUTPATIENT
Start: 2023-03-18 | End: 2023-05-12

## 2023-04-23 ENCOUNTER — HEALTH MAINTENANCE LETTER (OUTPATIENT)
Age: 63
End: 2023-04-23

## 2023-05-12 DIAGNOSIS — G47.00 INSOMNIA, UNSPECIFIED TYPE: Chronic | ICD-10-CM

## 2023-05-12 RX ORDER — TRAZODONE HYDROCHLORIDE 50 MG/1
TABLET, FILM COATED ORAL
Qty: 60 TABLET | Refills: 0 | Status: SHIPPED | OUTPATIENT
Start: 2023-05-12 | End: 2023-07-05

## 2023-06-07 DIAGNOSIS — G47.00 INSOMNIA, UNSPECIFIED TYPE: Chronic | ICD-10-CM

## 2023-06-07 NOTE — TELEPHONE ENCOUNTER
Will not fill prescription until an appointment is made.  Please, send me the chart after an appointment has bee made so I can sign the prescription

## 2023-06-08 RX ORDER — GABAPENTIN 100 MG/1
CAPSULE ORAL
Qty: 180 CAPSULE | Refills: 0 | Status: SHIPPED | OUTPATIENT
Start: 2023-06-08 | End: 2023-07-12

## 2023-07-04 DIAGNOSIS — G47.00 INSOMNIA, UNSPECIFIED TYPE: Chronic | ICD-10-CM

## 2023-07-05 RX ORDER — TRAZODONE HYDROCHLORIDE 50 MG/1
TABLET, FILM COATED ORAL
Qty: 60 TABLET | Refills: 0 | Status: SHIPPED | OUTPATIENT
Start: 2023-07-05 | End: 2023-07-12

## 2023-07-12 ENCOUNTER — OFFICE VISIT (OUTPATIENT)
Dept: FAMILY MEDICINE | Facility: CLINIC | Age: 63
End: 2023-07-12
Payer: COMMERCIAL

## 2023-07-12 VITALS
SYSTOLIC BLOOD PRESSURE: 135 MMHG | HEIGHT: 72 IN | TEMPERATURE: 97.9 F | RESPIRATION RATE: 14 BRPM | OXYGEN SATURATION: 97 % | WEIGHT: 169 LBS | DIASTOLIC BLOOD PRESSURE: 70 MMHG | BODY MASS INDEX: 22.89 KG/M2 | HEART RATE: 69 BPM

## 2023-07-12 DIAGNOSIS — G47.00 INSOMNIA, UNSPECIFIED TYPE: ICD-10-CM

## 2023-07-12 DIAGNOSIS — C85.92 NON-HODGKIN LYMPHOMA OF INTRATHORACIC LYMPH NODES, UNSPECIFIED NON-HODGKIN LYMPHOMA TYPE (H): ICD-10-CM

## 2023-07-12 LAB
BASOPHILS # BLD AUTO: 0 10E3/UL (ref 0–0.2)
BASOPHILS NFR BLD AUTO: 0 %
EOSINOPHIL # BLD AUTO: 0 10E3/UL (ref 0–0.7)
EOSINOPHIL NFR BLD AUTO: 0 %
ERYTHROCYTE [DISTWIDTH] IN BLOOD BY AUTOMATED COUNT: 12.8 % (ref 10–15)
HCT VFR BLD AUTO: 45.7 % (ref 40–53)
HGB BLD-MCNC: 15.6 G/DL (ref 13.3–17.7)
IMM GRANULOCYTES # BLD: 0 10E3/UL
IMM GRANULOCYTES NFR BLD: 0 %
LYMPHOCYTES # BLD AUTO: 1.4 10E3/UL (ref 0.8–5.3)
LYMPHOCYTES NFR BLD AUTO: 28 %
MCH RBC QN AUTO: 32.4 PG (ref 26.5–33)
MCHC RBC AUTO-ENTMCNC: 34.1 G/DL (ref 31.5–36.5)
MCV RBC AUTO: 95 FL (ref 78–100)
MONOCYTES # BLD AUTO: 0.5 10E3/UL (ref 0–1.3)
MONOCYTES NFR BLD AUTO: 11 %
NEUTROPHILS # BLD AUTO: 2.9 10E3/UL (ref 1.6–8.3)
NEUTROPHILS NFR BLD AUTO: 60 %
PLATELET # BLD AUTO: 194 10E3/UL (ref 150–450)
RBC # BLD AUTO: 4.81 10E6/UL (ref 4.4–5.9)
WBC # BLD AUTO: 4.9 10E3/UL (ref 4–11)

## 2023-07-12 PROCEDURE — 85025 COMPLETE CBC W/AUTO DIFF WBC: CPT | Performed by: FAMILY MEDICINE

## 2023-07-12 PROCEDURE — 90472 IMMUNIZATION ADMIN EACH ADD: CPT | Performed by: FAMILY MEDICINE

## 2023-07-12 PROCEDURE — 36415 COLL VENOUS BLD VENIPUNCTURE: CPT | Performed by: FAMILY MEDICINE

## 2023-07-12 PROCEDURE — 90715 TDAP VACCINE 7 YRS/> IM: CPT | Performed by: FAMILY MEDICINE

## 2023-07-12 PROCEDURE — 90471 IMMUNIZATION ADMIN: CPT | Performed by: FAMILY MEDICINE

## 2023-07-12 PROCEDURE — 90677 PCV20 VACCINE IM: CPT | Performed by: FAMILY MEDICINE

## 2023-07-12 PROCEDURE — 90750 HZV VACC RECOMBINANT IM: CPT | Performed by: FAMILY MEDICINE

## 2023-07-12 PROCEDURE — 99213 OFFICE O/P EST LOW 20 MIN: CPT | Mod: 25 | Performed by: FAMILY MEDICINE

## 2023-07-12 RX ORDER — TRAZODONE HYDROCHLORIDE 50 MG/1
TABLET, FILM COATED ORAL
Qty: 90 TABLET | Refills: 3 | Status: SHIPPED | OUTPATIENT
Start: 2023-07-12 | End: 2024-08-26

## 2023-07-12 RX ORDER — GABAPENTIN 100 MG/1
CAPSULE ORAL
Qty: 180 CAPSULE | Refills: 3 | Status: SHIPPED | OUTPATIENT
Start: 2023-07-12 | End: 2024-09-29

## 2023-07-12 ASSESSMENT — PAIN SCALES - GENERAL: PAINLEVEL: NO PAIN (0)

## 2023-07-12 NOTE — PROGRESS NOTES
"    Subjective   Jose Luis is a 62 year old, presenting for the following health issues:  Recheck Medication        7/12/2023     1:45 PM   Additional Questions   Roomed by Asha DE JESUS   Accompanied by self     History of Present Illness       Reason for visit:  Checkup    He eats 0-1 servings of fruits and vegetables daily.He consumes 0 sweetened beverage(s) daily.He exercises with enough effort to increase his heart rate 10 to 19 minutes per day.  He exercises with enough effort to increase his heart rate 5 days per week.   He is taking medications regularly.     SUBJECTIVE:  62 year old.The patient has a complaint of insomnia.  This started several years ago.   Associated symptoms are fatigue.   .  Better with gabapentin and Trazadone.        Reviewed health maintenance  Patient Active Problem List   Diagnosis     CARDIOVASCULAR SCREENING; LDL GOAL LESS THAN 160     Health Care Home     Non-Hodgkin's lymphoma (H)     ED (erectile dysfunction)     Advanced directives, counseling/discussion     Insomnia     PLMD (periodic limb movement disorder)     Past Medical History:   Diagnosis Date     Cataract 8/6/2013     [employer] Update utility     Malignant neoplasm (H)     non-Hodgkins Lymphoma       OBJECTIVE:  no apparent distress  /70   Pulse 69   Temp 97.9  F (36.6  C) (Tympanic)   Resp 14   Ht 1.816 m (5' 11.5\")   Wt 76.7 kg (169 lb)   SpO2 97%   BMI 23.24 kg/m      LUNGS:  CTA B/L, no wheezing or crackles.   Cardiovascular: negative, PMI normal. No lifts, heaves, or thrills. RRR. No murmurs, clicks gallops or rub        ICD-10-CM    1. Non-Hodgkin lymphoma of intrathoracic lymph nodes, unspecified non-Hodgkin lymphoma type (H)  C85.92       2. Insomnia, unspecified type  G47.00 CBC with platelets and differential     gabapentin (NEURONTIN) 100 MG capsule     traZODone (DESYREL) 50 MG tablet       PLAN: Follow up in 1 year       "

## 2023-07-12 NOTE — NURSING NOTE
Prior to immunization administration, verified patients identity using patient s name and date of birth. Please see Immunization Activity for additional information.     Screening Questionnaire for Adult Immunization    Are you sick today?   No   Do you have allergies to medications, food, a vaccine component or latex?   No   Have you ever had a serious reaction after receiving a vaccination?   No   Do you have a long-term health problem with heart, lung, kidney, or metabolic disease (e.g., diabetes), asthma, a blood disorder, no spleen, complement component deficiency, a cochlear implant, or a spinal fluid leak?  Are you on long-term aspirin therapy?   No   Do you have cancer, leukemia, HIV/AIDS, or any other immune system problem?   No   Do you have a parent, brother, or sister with an immune system problem?   No   In the past 3 months, have you taken medications that affect  your immune system, such as prednisone, other steroids, or anticancer drugs; drugs for the treatment of rheumatoid arthritis, Crohn s disease, or psoriasis; or have you had radiation treatments?   No   Have you had a seizure, or a brain or other nervous system problem?   No   During the past year, have you received a transfusion of blood or blood    products, or been given immune (gamma) globulin or antiviral drug?   No   For women: Are you pregnant or is there a chance you could become       pregnant during the next month?   No   Have you received any vaccinations in the past 4 weeks?   No     Immunization questionnaire answers were all negative.      Patient instructed to remain in clinic for 15 minutes afterwards, and to report any adverse reactions.     Screening performed by LINA WARD MA on 7/12/2023 at 2:35 PM.

## 2023-08-25 ENCOUNTER — MYC MEDICAL ADVICE (OUTPATIENT)
Dept: FAMILY MEDICINE | Facility: CLINIC | Age: 63
End: 2023-08-25
Payer: COMMERCIAL

## 2023-08-25 DIAGNOSIS — N52.9 ERECTILE DYSFUNCTION, UNSPECIFIED ERECTILE DYSFUNCTION TYPE: ICD-10-CM

## 2023-08-25 RX ORDER — TADALAFIL 20 MG/1
20 TABLET ORAL DAILY PRN
Qty: 90 TABLET | Refills: 1 | OUTPATIENT
Start: 2023-08-25

## 2023-08-25 RX ORDER — TADALAFIL 20 MG/1
20 TABLET ORAL DAILY PRN
Qty: 90 TABLET | Refills: 1 | Status: SHIPPED | OUTPATIENT
Start: 2023-08-25 | End: 2023-08-29

## 2023-08-25 NOTE — TELEPHONE ENCOUNTER
Patient asking for a hard copy of the cialis refill so he can get it from [facility].  It's cheaper from [facility].    Genevieve Fregoso BSN, RN

## 2023-08-30 RX ORDER — TADALAFIL 20 MG/1
20 TABLET ORAL DAILY PRN
Qty: 90 TABLET | Refills: 1 | Status: SHIPPED | OUTPATIENT
Start: 2023-08-30

## 2023-11-20 ENCOUNTER — TRANSFERRED RECORDS (OUTPATIENT)
Dept: HEALTH INFORMATION MANAGEMENT | Facility: CLINIC | Age: 63
End: 2023-11-20
Payer: COMMERCIAL

## 2023-11-28 NOTE — PROGRESS NOTES
Santana Amaya is a 62 year old, presenting for the following health issues:  Blood Pressure Check and Screening (Biometric Form )        11/29/2023     1:38 PM   Additional Questions   Roomed by Romero BECKER LPN   Accompanied by Self         11/29/2023     1:38 PM   Patient Reported Additional Medications   Patient reports taking the following new medications None       History of Present Illness       Reason for visit:  Biometric screening    He eats 0-1 servings of fruits and vegetables daily.He consumes 0 sweetened beverage(s) daily.He exercises with enough effort to increase his heart rate 9 or less minutes per day.  He exercises with enough effort to increase his heart rate 3 or less days per week.   He is taking medications regularly.     Review of Systems       Patient here for biometric screening only    ICD-10-CM    1. Screening for diabetes mellitus  Z13.1 Glucose      2. Lipid screening  Z13.220 Lipid panel reflex to direct LDL Fasting       PLAN:  Follow up in 1 year   The 10-year ASCVD risk score (Jena ZAMORA, et al., 2019) is: 9%    Values used to calculate the score:      Age: 62 years      Sex: Male      Is Non- : No      Diabetic: No      Tobacco smoker: No      Systolic Blood Pressure: 132 mmHg      Is BP treated: No      HDL Cholesterol: 98 mg/dL      Total Cholesterol: 251 mg/dL  Will notify patient that cholesterol is up and he should consider diet change and/or statin

## 2023-11-29 ENCOUNTER — OFFICE VISIT (OUTPATIENT)
Dept: FAMILY MEDICINE | Facility: CLINIC | Age: 63
End: 2023-11-29
Payer: COMMERCIAL

## 2023-11-29 VITALS
TEMPERATURE: 98.1 F | DIASTOLIC BLOOD PRESSURE: 70 MMHG | HEIGHT: 71 IN | BODY MASS INDEX: 23.35 KG/M2 | RESPIRATION RATE: 16 BRPM | SYSTOLIC BLOOD PRESSURE: 132 MMHG | HEART RATE: 84 BPM | OXYGEN SATURATION: 100 % | WEIGHT: 166.8 LBS

## 2023-11-29 DIAGNOSIS — Z13.1 SCREENING FOR DIABETES MELLITUS: Primary | ICD-10-CM

## 2023-11-29 DIAGNOSIS — Z13.220 LIPID SCREENING: ICD-10-CM

## 2023-11-29 LAB — HOLD SPECIMEN: NORMAL

## 2023-11-29 PROCEDURE — 82947 ASSAY GLUCOSE BLOOD QUANT: CPT | Performed by: FAMILY MEDICINE

## 2023-11-29 PROCEDURE — 99212 OFFICE O/P EST SF 10 MIN: CPT | Mod: 25 | Performed by: FAMILY MEDICINE

## 2023-11-29 PROCEDURE — 36415 COLL VENOUS BLD VENIPUNCTURE: CPT | Performed by: FAMILY MEDICINE

## 2023-11-29 PROCEDURE — 90686 IIV4 VACC NO PRSV 0.5 ML IM: CPT | Performed by: FAMILY MEDICINE

## 2023-11-29 PROCEDURE — 90471 IMMUNIZATION ADMIN: CPT | Performed by: FAMILY MEDICINE

## 2023-11-29 PROCEDURE — 80061 LIPID PANEL: CPT | Performed by: FAMILY MEDICINE

## 2023-11-29 ASSESSMENT — PAIN SCALES - GENERAL: PAINLEVEL: NO PAIN (0)

## 2023-11-29 NOTE — LETTER
November 30, 2023      Jose Luis Finn  [id number] [facility] [facility] [id number]        Dear ,    We are writing to inform you of your test results.    I received the lipid profile we claudia on 11/29:   There has been a worsening of the cholesterol results.  This may be from a non fasting lab.  I would like you to follow a low fat diet for 3 months and check it again. The diet should have less dairy products with only using skim milk or white cheeses.  The following is your risk factors with the isreal results:     The 10-year ASCVD risk score (Jena ZAMORA, et al., 2019) is: 9%     Values used to calculate the score:       Age: 62 years       Sex: Male       Is Non- : No       Diabetic: No       Tobacco smoker: No       Systolic Blood Pressure: 132 mmHg       Is BP treated: No       HDL Cholesterol: 98 mg/dL       Total Cholesterol: 251 mg/dL     If in 3 months the risk is not less than 7.5% we should start you on medication to lower the cholesterol. I hav placed the orders for a fasting blood draw in 3 months.     Resulted Orders   Glucose   Result Value Ref Range    Glucose 103 (H) 70 - 99 mg/dL    Patient Fasting > 8hrs? No    Lipid panel reflex to direct LDL Fasting   Result Value Ref Range    Cholesterol 251 (H) <200 mg/dL    Triglycerides 99 <150 mg/dL    Direct Measure HDL 98 >=40 mg/dL    LDL Cholesterol Calculated 133 (H) <=100 mg/dL    Non HDL Cholesterol 153 (H) <130 mg/dL    Narrative    Cholesterol  Desirable:  <200 mg/dL    Triglycerides  Normal:  Less than 150 mg/dL  Borderline High:  150-199 mg/dL  High:  200-499 mg/dL  Very High:  Greater than or equal to 500 mg/dL    Direct Measure HDL  Female:  Greater than or equal to 50 mg/dL   Male:  Greater than or equal to 40 mg/dL    LDL Cholesterol  Desirable:  <100mg/dL  Above Desirable:  100-129 mg/dL   Borderline High:  130-159 mg/dL   High:  160-189 mg/dL   Very High:  >= 190 mg/dL    Non HDL Cholesterol  Desirable:  130  mg/dL  Above Desirable:  130-159 mg/dL  Borderline High:  160-189 mg/dL  High:  190-219 mg/dL  Very High:  Greater than or equal to 220 mg/dL       If you have any questions or concerns, please call the clinic at the number listed above.       Sincerely,      Shiva Griffin MD

## 2023-11-30 ENCOUNTER — MYC MEDICAL ADVICE (OUTPATIENT)
Dept: FAMILY MEDICINE | Facility: CLINIC | Age: 63
End: 2023-11-30
Payer: COMMERCIAL

## 2023-11-30 DIAGNOSIS — E78.00 HIGH CHOLESTEROL: Primary | ICD-10-CM

## 2023-11-30 LAB
CHOLEST SERPL-MCNC: 251 MG/DL
FASTING STATUS PATIENT QL REPORTED: NO
GLUCOSE SERPL-MCNC: 103 MG/DL (ref 70–99)
HDLC SERPL-MCNC: 98 MG/DL
LDLC SERPL CALC-MCNC: 133 MG/DL
NONHDLC SERPL-MCNC: 153 MG/DL
TRIGL SERPL-MCNC: 99 MG/DL

## 2023-11-30 RX ORDER — ROSUVASTATIN CALCIUM 10 MG/1
10 TABLET, COATED ORAL DAILY
Qty: 90 TABLET | Refills: 0 | Status: SHIPPED | OUTPATIENT
Start: 2023-11-30 | End: 2024-03-25

## 2023-12-04 ENCOUNTER — TELEPHONE (OUTPATIENT)
Dept: FAMILY MEDICINE | Facility: CLINIC | Age: 63
End: 2023-12-04
Payer: COMMERCIAL

## 2023-12-04 NOTE — TELEPHONE ENCOUNTER
Faxed completed Biometric form to [employer] @ [phone].Eli GOEL [facility]    Placed a copy to be scanned in, and placed a copy in the TC form folder.Eli KIRK [facility]

## 2024-03-25 ENCOUNTER — MYC REFILL (OUTPATIENT)
Dept: FAMILY MEDICINE | Facility: CLINIC | Age: 64
End: 2024-03-25
Payer: COMMERCIAL

## 2024-03-25 DIAGNOSIS — E78.00 HIGH CHOLESTEROL: ICD-10-CM

## 2024-03-25 RX ORDER — ROSUVASTATIN CALCIUM 10 MG/1
10 TABLET, COATED ORAL DAILY
Qty: 90 TABLET | Refills: 0 | Status: SHIPPED | OUTPATIENT
Start: 2024-03-25 | End: 2024-06-20

## 2024-04-03 ENCOUNTER — PATIENT OUTREACH (OUTPATIENT)
Dept: FAMILY MEDICINE | Facility: CLINIC | Age: 64
End: 2024-04-03
Payer: COMMERCIAL

## 2024-04-03 NOTE — TELEPHONE ENCOUNTER
Patient Quality Outreach    Patient is due for the following:   Physical Preventive Adult Physical    Next Steps:   Schedule a Adult Preventative    Type of outreach:    Sent [employer] message.      Questions for provider review:    None           Cheryl Finney, CMA

## 2024-06-17 PROBLEM — Z71.89 ADVANCED DIRECTIVES, COUNSELING/DISCUSSION: Status: RESOLVED | Noted: 2017-04-11 | Resolved: 2024-06-17

## 2024-06-20 DIAGNOSIS — E78.00 HIGH CHOLESTEROL: ICD-10-CM

## 2024-06-20 RX ORDER — ROSUVASTATIN CALCIUM 10 MG/1
10 TABLET, COATED ORAL DAILY
Qty: 90 TABLET | Refills: 1 | Status: SHIPPED | OUTPATIENT
Start: 2024-06-20

## 2024-06-29 ENCOUNTER — HEALTH MAINTENANCE LETTER (OUTPATIENT)
Age: 64
End: 2024-06-29

## 2024-08-25 DIAGNOSIS — G47.00 INSOMNIA, UNSPECIFIED TYPE: ICD-10-CM

## 2024-08-26 RX ORDER — TRAZODONE HYDROCHLORIDE 50 MG/1
TABLET, FILM COATED ORAL
Qty: 90 TABLET | Refills: 0 | Status: SHIPPED | OUTPATIENT
Start: 2024-08-26

## 2024-09-18 ENCOUNTER — ANCILLARY PROCEDURE (OUTPATIENT)
Dept: GENERAL RADIOLOGY | Facility: CLINIC | Age: 64
End: 2024-09-18
Attending: FAMILY MEDICINE
Payer: COMMERCIAL

## 2024-09-18 ENCOUNTER — OFFICE VISIT (OUTPATIENT)
Dept: FAMILY MEDICINE | Facility: CLINIC | Age: 64
End: 2024-09-18
Payer: COMMERCIAL

## 2024-09-18 VITALS
BODY MASS INDEX: 23.3 KG/M2 | SYSTOLIC BLOOD PRESSURE: 136 MMHG | HEIGHT: 71 IN | OXYGEN SATURATION: 98 % | TEMPERATURE: 97.4 F | WEIGHT: 166.4 LBS | HEART RATE: 77 BPM | DIASTOLIC BLOOD PRESSURE: 77 MMHG | RESPIRATION RATE: 18 BRPM

## 2024-09-18 DIAGNOSIS — M54.2 NECK PAIN: ICD-10-CM

## 2024-09-18 DIAGNOSIS — M54.2 NECK PAIN: Primary | ICD-10-CM

## 2024-09-18 PROCEDURE — 90471 IMMUNIZATION ADMIN: CPT | Performed by: FAMILY MEDICINE

## 2024-09-18 PROCEDURE — 72040 X-RAY EXAM NECK SPINE 2-3 VW: CPT | Mod: TC | Performed by: RADIOLOGY

## 2024-09-18 PROCEDURE — 99214 OFFICE O/P EST MOD 30 MIN: CPT | Mod: 25 | Performed by: FAMILY MEDICINE

## 2024-09-18 PROCEDURE — 90673 RIV3 VACCINE NO PRESERV IM: CPT | Performed by: FAMILY MEDICINE

## 2024-09-18 ASSESSMENT — PAIN SCALES - GENERAL: PAINLEVEL: NO PAIN (0)

## 2024-09-18 NOTE — PROGRESS NOTES
"      Subjective   Jose Luis is a 63 year old, presenting for the following health issues:  Neck Pain        9/18/2024    10:38 AM   Additional Questions   Roomed by Dorota   Accompanied by RACHELLE     History of Present Illness       Reason for visit:  Neck pain  Symptom onset:  More than a month  Symptoms include:  Sore neck  Symptom intensity:  Moderate  Symptom progression:  Staying the same  Had these symptoms before:  No  What makes it worse:  Turning my head   He is taking medications regularly.   SUBJECTIVE:  Rajiv Finn is a 63 year old male who is seen for neck pain   8-9months ago.   Mechanism of injury: no injury.   . Modifying Factors:     Pain Improved with:Tylenol    Pain Worsened with: in am sometimes after sleeping  Numbness or Tingling: no  Prior history of related problems: no prior problems with this area in the past.      Patients past medical, surgical, social and family histories reviewed.    EXAM:  /77   Pulse 77   Temp 97.4  F (36.3  C) (Tympanic)   Resp 18   Ht 1.803 m (5' 10.98\")   Wt 75.5 kg (166 lb 6.4 oz)   SpO2 98%   BMI 23.22 kg/m      MUSCULOSKELETAL:  Inspection: normal cervical lordosis  Tender:  non  Range of Motion:  Full ROM of cervical spine  X-RAY INTERPRETATION  X-Ray of the cervical spine:     pending radiology           Signed Electronically by: Shiva Griffin MD    "

## 2024-09-28 DIAGNOSIS — G47.00 INSOMNIA, UNSPECIFIED TYPE: ICD-10-CM

## 2024-09-29 RX ORDER — GABAPENTIN 100 MG/1
CAPSULE ORAL
Qty: 180 CAPSULE | Refills: 3 | Status: SHIPPED | OUTPATIENT
Start: 2024-09-29

## 2024-10-01 ENCOUNTER — TELEPHONE (OUTPATIENT)
Dept: FAMILY MEDICINE | Facility: CLINIC | Age: 64
End: 2024-10-01
Payer: COMMERCIAL

## 2024-10-01 NOTE — TELEPHONE ENCOUNTER
Faxed referral to [facility], to the fax number provided. Called and informed patient that this was done.Eli [facility]

## 2024-10-01 NOTE — TELEPHONE ENCOUNTER
General Call      Reason for Call:  Patient has a referral from DR Griffin that was put in on 9/18/24 to the [facility] clinic (non [place]) but they have not received the referral. Please fax ASAP to Fax: [phone] as he has an appointment on 10/2/24 in the afternoon. Referral in [facility] says it is an external referral but then has a [place] scheduling number.     What are your questions or concerns:      M54.2 (ICD-10-CM) - Neck pain       Date of last appointment with provider: 9/18/24    Could we send this information to you in [employer] or would you prefer to receive a phone call?:   Patient would prefer a phone call   Okay to leave a detailed message?: Yes at Cell number on file:    Telephone Information:   Mobile [phone]

## 2024-10-02 ENCOUNTER — TRANSFERRED RECORDS (OUTPATIENT)
Dept: HEALTH INFORMATION MANAGEMENT | Facility: CLINIC | Age: 64
End: 2024-10-02

## 2024-10-02 ENCOUNTER — MEDICAL CORRESPONDENCE (OUTPATIENT)
Dept: HEALTH INFORMATION MANAGEMENT | Facility: CLINIC | Age: 64
End: 2024-10-02
Payer: COMMERCIAL

## 2024-10-04 ENCOUNTER — MYC MEDICAL ADVICE (OUTPATIENT)
Dept: FAMILY MEDICINE | Facility: CLINIC | Age: 64
End: 2024-10-04
Payer: COMMERCIAL

## 2024-10-04 DIAGNOSIS — N52.9 ERECTILE DYSFUNCTION, UNSPECIFIED ERECTILE DYSFUNCTION TYPE: ICD-10-CM

## 2024-10-04 RX ORDER — TADALAFIL 20 MG/1
20 TABLET ORAL DAILY PRN
Qty: 90 TABLET | Refills: 1 | Status: CANCELLED | OUTPATIENT
Start: 2024-10-04

## 2024-10-31 ENCOUNTER — MYC REFILL (OUTPATIENT)
Dept: FAMILY MEDICINE | Facility: CLINIC | Age: 64
End: 2024-10-31
Payer: COMMERCIAL

## 2024-10-31 DIAGNOSIS — N52.9 ERECTILE DYSFUNCTION, UNSPECIFIED ERECTILE DYSFUNCTION TYPE: ICD-10-CM

## 2024-10-31 RX ORDER — TADALAFIL 20 MG/1
20 TABLET ORAL DAILY PRN
Qty: 90 TABLET | Refills: 1 | Status: SHIPPED | OUTPATIENT
Start: 2024-10-31 | End: 2024-10-31

## 2024-10-31 NOTE — TELEPHONE ENCOUNTER
Patient calling back and would like this Rx sent to [facility] - is that something we can do? States he has had this done in the past and usually we print it and leave it at the  for him and he sends it off to them. Patient is aware that Dr. Horne is out until Wednesday. Rx was sent to [facility] but does not want it to go there.     Cherry KIMBALL,    [facility]

## 2024-10-31 NOTE — TELEPHONE ENCOUNTER
Patient requesting local print of medication script. Please sign and have primary care team follow up with patient.     Rubens SIMMS RN 10/31/2024 at 3:36 PM

## 2024-11-06 RX ORDER — TADALAFIL 20 MG/1
20 TABLET ORAL DAILY PRN
Qty: 90 TABLET | Refills: 1 | Status: SHIPPED | OUTPATIENT
Start: 2024-11-06

## 2024-11-06 NOTE — TELEPHONE ENCOUNTER
RX brought to the  for . Left message on patient's voicemail that this was done.Eli Peace [facility]

## 2024-11-15 ENCOUNTER — TRANSFERRED RECORDS (OUTPATIENT)
Dept: HEALTH INFORMATION MANAGEMENT | Facility: CLINIC | Age: 64
End: 2024-11-15
Payer: COMMERCIAL

## 2024-11-24 DIAGNOSIS — G47.00 INSOMNIA, UNSPECIFIED TYPE: ICD-10-CM

## 2024-11-24 RX ORDER — TRAZODONE HYDROCHLORIDE 50 MG/1
TABLET, FILM COATED ORAL
Qty: 90 TABLET | Refills: 2 | Status: SHIPPED | OUTPATIENT
Start: 2024-11-24

## 2024-12-04 ENCOUNTER — TELEPHONE (OUTPATIENT)
Dept: FAMILY MEDICINE | Facility: CLINIC | Age: 64
End: 2024-12-04
Payer: COMMERCIAL

## 2024-12-04 DIAGNOSIS — N52.9 ERECTILE DYSFUNCTION, UNSPECIFIED ERECTILE DYSFUNCTION TYPE: ICD-10-CM

## 2024-12-04 NOTE — TELEPHONE ENCOUNTER
Medication Question or Refill        What medication are you calling about (include dose and sig)?:   tadalafil (CIALIS) 20 MG tablet 90 tablet 1 11/6/2024 -- No   Sig - Route: Take 1 tablet (20 mg) by mouth daily as needed (ED). - Oral   Class: Local Print     Preferred Pharmacy:     WRITTEN PRESCRIPTION REQUESTED    Controlled Substance Agreement on file:   CSA -- Patient Level:    CSA: None found at the patient level.       Who prescribed the medication?: Dr. Griffin    Do you need a refill? Yes    Do you have any questions or concerns?  Yes: Patient would like to  paper copy of Rx at [place] .  Please call when Rx is ready.  Thankyou.    Could we send this information to you in [employer] or would you prefer to receive a phone call?:   Patient would prefer a phone call   Okay to leave a detailed message?: Yes at Cell number on file:    Telephone Information:   Mobile [phone]

## 2024-12-11 RX ORDER — TADALAFIL 20 MG/1
20 TABLET ORAL DAILY PRN
Qty: 90 TABLET | Refills: 1 | Status: CANCELLED | OUTPATIENT
Start: 2024-12-11

## 2024-12-11 RX ORDER — TADALAFIL 20 MG/1
20 TABLET ORAL DAILY PRN
Qty: 90 TABLET | Refills: 1 | Status: SHIPPED | OUTPATIENT
Start: 2024-12-11

## 2024-12-11 NOTE — TELEPHONE ENCOUNTER
RX brought to the  for . Called and informed patient that this was done.Eli Peace [facility]

## 2024-12-14 DIAGNOSIS — E78.00 HIGH CHOLESTEROL: ICD-10-CM

## 2024-12-15 RX ORDER — ROSUVASTATIN CALCIUM 10 MG/1
10 TABLET, COATED ORAL DAILY
Qty: 90 TABLET | Refills: 1 | Status: SHIPPED | OUTPATIENT
Start: 2024-12-15

## 2025-01-20 ENCOUNTER — MYC REFILL (OUTPATIENT)
Dept: FAMILY MEDICINE | Facility: CLINIC | Age: 65
End: 2025-01-20
Payer: COMMERCIAL

## 2025-01-20 DIAGNOSIS — G47.00 INSOMNIA, UNSPECIFIED TYPE: ICD-10-CM

## 2025-01-20 RX ORDER — TRAZODONE HYDROCHLORIDE 50 MG/1
100 TABLET, FILM COATED ORAL AT BEDTIME
Qty: 180 TABLET | Refills: 0 | OUTPATIENT
Start: 2025-01-20

## 2025-01-20 RX ORDER — TRAZODONE HYDROCHLORIDE 50 MG/1
100 TABLET, FILM COATED ORAL AT BEDTIME
Qty: 180 TABLET | Refills: 0 | Status: SHIPPED | OUTPATIENT
Start: 2025-01-20 | End: 2025-01-20

## 2025-01-20 RX ORDER — TRAZODONE HYDROCHLORIDE 50 MG/1
100 TABLET, FILM COATED ORAL AT BEDTIME
Qty: 180 TABLET | Refills: 0 | Status: SHIPPED | OUTPATIENT
Start: 2025-01-20

## 2025-02-08 ENCOUNTER — HEALTH MAINTENANCE LETTER (OUTPATIENT)
Age: 65
End: 2025-02-08

## 2025-03-18 ENCOUNTER — PATIENT OUTREACH (OUTPATIENT)
Dept: FAMILY MEDICINE | Facility: CLINIC | Age: 65
End: 2025-03-18
Payer: COMMERCIAL

## 2025-03-18 NOTE — TELEPHONE ENCOUNTER
Patient Quality Outreach    Patient is due for the following:   Physical Annual Wellness Visit    Action(s) Taken:   Schedule a Annual Wellness Visit    Type of outreach:    Sent [employer] message.    Questions for provider review:    None           Coni Fuentes MA

## 2025-04-09 ENCOUNTER — OFFICE VISIT (OUTPATIENT)
Dept: FAMILY MEDICINE | Facility: CLINIC | Age: 65
End: 2025-04-09
Payer: COMMERCIAL

## 2025-04-09 VITALS
WEIGHT: 171.8 LBS | BODY MASS INDEX: 24.05 KG/M2 | HEART RATE: 80 BPM | RESPIRATION RATE: 18 BRPM | OXYGEN SATURATION: 97 % | DIASTOLIC BLOOD PRESSURE: 81 MMHG | TEMPERATURE: 97 F | HEIGHT: 71 IN | SYSTOLIC BLOOD PRESSURE: 127 MMHG

## 2025-04-09 DIAGNOSIS — E78.00 HIGH CHOLESTEROL: Primary | ICD-10-CM

## 2025-04-09 DIAGNOSIS — G47.00 INSOMNIA, UNSPECIFIED TYPE: ICD-10-CM

## 2025-04-09 PROCEDURE — 1126F AMNT PAIN NOTED NONE PRSNT: CPT | Performed by: FAMILY MEDICINE

## 2025-04-09 PROCEDURE — 36415 COLL VENOUS BLD VENIPUNCTURE: CPT | Performed by: FAMILY MEDICINE

## 2025-04-09 PROCEDURE — 3079F DIAST BP 80-89 MM HG: CPT | Performed by: FAMILY MEDICINE

## 2025-04-09 PROCEDURE — 3074F SYST BP LT 130 MM HG: CPT | Performed by: FAMILY MEDICINE

## 2025-04-09 PROCEDURE — 80061 LIPID PANEL: CPT | Performed by: FAMILY MEDICINE

## 2025-04-09 PROCEDURE — 80053 COMPREHEN METABOLIC PANEL: CPT | Performed by: FAMILY MEDICINE

## 2025-04-09 PROCEDURE — 99214 OFFICE O/P EST MOD 30 MIN: CPT | Performed by: FAMILY MEDICINE

## 2025-04-09 RX ORDER — TRAZODONE HYDROCHLORIDE 50 MG/1
100 TABLET ORAL AT BEDTIME
Qty: 180 TABLET | Refills: 3 | Status: SHIPPED | OUTPATIENT
Start: 2025-04-09

## 2025-04-09 RX ORDER — ROSUVASTATIN CALCIUM 10 MG/1
10 TABLET, COATED ORAL DAILY
Qty: 90 TABLET | Refills: 1 | Status: SHIPPED | OUTPATIENT
Start: 2025-04-09

## 2025-04-09 ASSESSMENT — PAIN SCALES - GENERAL: PAINLEVEL_OUTOF10: NO PAIN (0)

## 2025-04-09 NOTE — PROGRESS NOTES
ICD-10-CM    1. High cholesterol  E78.00 Lipid panel reflex to direct LDL Non-fasting     rosuvastatin (CRESTOR) 10 MG tablet     Comprehensive metabolic panel (BMP + Alb, Alk Phos, ALT, AST, Total. Bili, TP)      2. Insomnia, unspecified type  G47.00 traZODone (DESYREL) 50 MG tablet       PLAN:Follow up in 1 year       Subjective   Jose Luis is a 64 year old, presenting for the following health issues:  Recheck Medication        4/9/2025     1:10 PM   Additional Questions   Roomed by Dorota KIMBALL CMA   Accompanied by RACHELLE         SUBJECTIVE:  64 year old enters for recheck of high cholesterol.  Pt. Has been taking med and has no side effects. Pt is following diet.  Denies chest pain and SOB.  Insomnia without Trazodone.  Without it he would wake up for an hour.  No side effects  Past Medical History:   Diagnosis Date    Cataract 8/6/2013     Imo Update utility    Malignant neoplasm (H)     non-Hodgkins Lymphoma     Past Surgical History:   Procedure Laterality Date    COLONOSCOPY WITH CO2 INSUFFLATION N/A 6/8/2015    Procedure: COLONOSCOPY WITH CO2 INSUFFLATION;  Surgeon: Ritesh Castle MD;  Location:  OR    NO HISTORY OF SURGERY         Current Outpatient Medications:     azithromycin (ZITHROMAX) 250 MG tablet, Two tablets first day, then one tablet daily for four days., Disp: 6 tablet, Rfl: 0    gabapentin (NEURONTIN) 100 MG capsule, TAKE 2 CAPSULES BY MOUTH AT BEDTIME, Disp: 180 capsule, Rfl: 3    rosuvastatin (CRESTOR) 10 MG tablet, Take 1 tablet (10 mg) by mouth daily., Disp: 90 tablet, Rfl: 1    tadalafil (CIALIS) 20 MG tablet, Take 1 tablet (20 mg) by mouth daily as needed (ED)., Disp: 90 tablet, Rfl: 1    traZODone (DESYREL) 50 MG tablet, Take 2 tablets (100 mg) by mouth at bedtime. NEED APPT, Disp: 180 tablet, Rfl: 3  Reviewed health maintenance   Patient Active Problem List   Diagnosis    CARDIOVASCULAR SCREENING; LDL GOAL LESS THAN 160    Non-Hodgkin's lymphoma (H)    ED (erectile dysfunction)     "Insomnia    PLMD (periodic limb movement disorder)       OBJECTIVE:  no apparent distress  /81   Pulse 80   Temp 97  F (36.1  C) (Tympanic)   Resp 18   Ht 1.803 m (5' 10.98\")   Wt 77.9 kg (171 lb 12.8 oz)   SpO2 97%   BMI 23.97 kg/m        Exam:  Constitutional: healthy, alert and no distress  Head: Normocephalic. No masses, lesions, tenderness or abnormalities  Neck: Neck supple. No adenopathy. Thyroid symmetric, normal size,  Cardiovascular: negative, PMI normal. No lifts, heaves, or thrills. RRR. No murmurs, clicks gallops or rub  Respiratory: negative Lungs clear    Office Visit on 11/29/2023   Component Date Value Ref Range Status    Glucose 11/29/2023 103 (H)  70 - 99 mg/dL Final    Patient Fasting > 8hrs? 11/29/2023 No   Final    Cholesterol 11/29/2023 251 (H)  <200 mg/dL Final    Triglycerides 11/29/2023 99  <150 mg/dL Final    Direct Measure HDL 11/29/2023 98  >=40 mg/dL Final    LDL Cholesterol Calculated 11/29/2023 133 (H)  <=100 mg/dL Final    Non HDL Cholesterol 11/29/2023 153 (H)  <130 mg/dL Final    Hold Specimen 11/29/2023 [facility]   Final         Signed Electronically by: Shiva Griffin MD    Answers submitted by the patient for this visit:  General Questionnaire (Submitted on 4/9/2025)  Chief Complaint: Chronic problems general questions HPI Form  What is the reason for your visit today? : medication  How many days per week do you miss taking your medication?: 0  Questionnaire about: Chronic problems general questions HPI Form (Submitted on 4/9/2025)  Chief Complaint: Chronic problems general questions HPI Form    "

## 2025-04-10 LAB
ALBUMIN SERPL BCG-MCNC: 4.2 G/DL (ref 3.5–5.2)
ALP SERPL-CCNC: 48 U/L (ref 40–150)
ALT SERPL W P-5'-P-CCNC: 27 U/L (ref 0–70)
ANION GAP SERPL CALCULATED.3IONS-SCNC: 8 MMOL/L (ref 7–15)
AST SERPL W P-5'-P-CCNC: 24 U/L (ref 0–45)
BILIRUB SERPL-MCNC: 0.5 MG/DL
BUN SERPL-MCNC: 8.9 MG/DL (ref 8–23)
CALCIUM SERPL-MCNC: 9.2 MG/DL (ref 8.8–10.4)
CHLORIDE SERPL-SCNC: 101 MMOL/L (ref 98–107)
CHOLEST SERPL-MCNC: 175 MG/DL
CREAT SERPL-MCNC: 0.77 MG/DL (ref 0.67–1.17)
EGFRCR SERPLBLD CKD-EPI 2021: >90 ML/MIN/1.73M2
FASTING STATUS PATIENT QL REPORTED: NO
FASTING STATUS PATIENT QL REPORTED: NO
GLUCOSE SERPL-MCNC: 105 MG/DL (ref 70–99)
HCO3 SERPL-SCNC: 28 MMOL/L (ref 22–29)
HDLC SERPL-MCNC: 49 MG/DL
LDLC SERPL CALC-MCNC: 99 MG/DL
NONHDLC SERPL-MCNC: 126 MG/DL
POTASSIUM SERPL-SCNC: 4.9 MMOL/L (ref 3.4–5.3)
PROT SERPL-MCNC: 6.9 G/DL (ref 6.4–8.3)
SODIUM SERPL-SCNC: 137 MMOL/L (ref 135–145)
TRIGL SERPL-MCNC: 134 MG/DL

## 2025-06-23 DIAGNOSIS — N52.9 ERECTILE DYSFUNCTION, UNSPECIFIED ERECTILE DYSFUNCTION TYPE: ICD-10-CM

## 2025-06-23 RX ORDER — TADALAFIL 20 MG/1
20 TABLET ORAL DAILY PRN
Status: SHIPPED
Start: 2025-06-23

## 2025-07-08 DIAGNOSIS — N52.9 ERECTILE DYSFUNCTION, UNSPECIFIED ERECTILE DYSFUNCTION TYPE: ICD-10-CM

## 2025-07-08 RX ORDER — TADALAFIL 20 MG/1
20 TABLET ORAL DAILY PRN
Qty: 90 TABLET | Refills: 3 | Status: SHIPPED | OUTPATIENT
Start: 2025-07-08

## 2025-07-08 NOTE — TELEPHONE ENCOUNTER
Note: We request replies be sent by normal FAX methods. Unfortunately, we do NOT use any form of Electronic Prescribing. Thank you.

## 2025-07-16 ENCOUNTER — OFFICE VISIT (OUTPATIENT)
Dept: FAMILY MEDICINE | Facility: CLINIC | Age: 65
End: 2025-07-16
Payer: COMMERCIAL

## 2025-07-16 VITALS
SYSTOLIC BLOOD PRESSURE: 132 MMHG | HEART RATE: 82 BPM | RESPIRATION RATE: 18 BRPM | BODY MASS INDEX: 23.74 KG/M2 | WEIGHT: 169.6 LBS | HEIGHT: 71 IN | OXYGEN SATURATION: 99 % | DIASTOLIC BLOOD PRESSURE: 74 MMHG | TEMPERATURE: 97 F

## 2025-07-16 DIAGNOSIS — Z12.11 SCREEN FOR COLON CANCER: Primary | ICD-10-CM

## 2025-07-16 DIAGNOSIS — G44.1 VASCULAR HEADACHE: ICD-10-CM

## 2025-07-16 PROCEDURE — 99214 OFFICE O/P EST MOD 30 MIN: CPT | Performed by: FAMILY MEDICINE

## 2025-07-16 PROCEDURE — 3075F SYST BP GE 130 - 139MM HG: CPT | Performed by: FAMILY MEDICINE

## 2025-07-16 PROCEDURE — 3078F DIAST BP <80 MM HG: CPT | Performed by: FAMILY MEDICINE

## 2025-07-16 PROCEDURE — 1126F AMNT PAIN NOTED NONE PRSNT: CPT | Performed by: FAMILY MEDICINE

## 2025-07-16 ASSESSMENT — PAIN SCALES - GENERAL: PAINLEVEL_OUTOF10: NO PAIN (0)

## 2025-07-16 ASSESSMENT — ENCOUNTER SYMPTOMS: HEADACHES: 1

## 2025-07-16 NOTE — PROGRESS NOTES
"  ICD-10-CM    1. Screen for colon cancer  Z12.11 COLOGUARD([employer])      2. Vascular headache  G44.1        PLAN:reassurance Retun to clinic if symptoms returns or gets worse  Subjective   Jose Luis is a 64 year old, presenting for the following health issues:  Headache        7/16/2025     2:40 PM   Additional Questions   Roomed by Dorota KIMBALL CMA   Accompanied by RACHELLE     Headache     History of Present Illness       Headaches:   Since the patient's last clinic visit, headaches are: improved  The patient is getting headaches:  It's just been recent.  He is able to do normal daily activities when he has a migraine.  The patient is taking the following rescue/relief medications:  Tylenol   Patient states \"I get total relief\" from the rescue/relief medications.   The patient is taking the following medications to prevent migraines:  No medications to prevent migraines  In the past 4 weeks, the patient has gone to an Urgent Care or Emergency Room 0 times times due to headaches.   He is taking medications regularly.       SUBJECTIVE:  64 year old.The patient has a complaint episodes of pressure in his head..  This started  one week ago.  quality feels hot Associated symptoms are face flushes.  Brought on by unknown .  Better with time.  Episodes last no more than 5 minutes but are decreasing numbers.  He had 3 times per day and now are one time per da. 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   Cardiovascular: negative, PMI normal. No lifts, heaves, or thrills. RRR. No murmurs, clicks gallops or rub   Gastrointestinal: Abdomen soft, non-tender. BS normal. No masses, organomegaly           Signed Electronically by: Shiva Griffin MD

## 2025-07-17 ENCOUNTER — ORDERS ONLY (AUTO-RELEASED) (OUTPATIENT)
Dept: FAMILY MEDICINE | Facility: CLINIC | Age: 65
End: 2025-07-17
Payer: COMMERCIAL

## 2025-07-17 DIAGNOSIS — Z12.11 SCREEN FOR COLON CANCER: ICD-10-CM

## 2025-07-24 ENCOUNTER — E-VISIT (OUTPATIENT)
Dept: FAMILY MEDICINE | Facility: CLINIC | Age: 65
End: 2025-07-24
Payer: COMMERCIAL

## 2025-07-24 DIAGNOSIS — R05.2 SUBACUTE COUGH: Primary | ICD-10-CM

## 2025-08-14 ENCOUNTER — MYC MEDICAL ADVICE (OUTPATIENT)
Dept: FAMILY MEDICINE | Facility: CLINIC | Age: 65
End: 2025-08-14
Payer: COMMERCIAL